# Patient Record
Sex: FEMALE | Race: BLACK OR AFRICAN AMERICAN | NOT HISPANIC OR LATINO | Employment: UNEMPLOYED | URBAN - METROPOLITAN AREA
[De-identification: names, ages, dates, MRNs, and addresses within clinical notes are randomized per-mention and may not be internally consistent; named-entity substitution may affect disease eponyms.]

---

## 2024-03-31 ENCOUNTER — APPOINTMENT (OUTPATIENT)
Dept: RADIOLOGY | Facility: HOSPITAL | Age: 64
End: 2024-03-31
Payer: MEDICARE

## 2024-03-31 ENCOUNTER — HOSPITAL ENCOUNTER (EMERGENCY)
Facility: HOSPITAL | Age: 64
Discharge: LEFT BEFORE DISCHARGE | End: 2024-04-01
Attending: STUDENT IN AN ORGANIZED HEALTH CARE EDUCATION/TRAINING PROGRAM
Payer: MEDICARE

## 2024-03-31 ENCOUNTER — APPOINTMENT (OUTPATIENT)
Dept: CARDIOLOGY | Facility: HOSPITAL | Age: 64
End: 2024-03-31
Payer: MEDICARE

## 2024-03-31 DIAGNOSIS — J45.909 UNCOMPLICATED ASTHMA, UNSPECIFIED ASTHMA SEVERITY, UNSPECIFIED WHETHER PERSISTENT (HHS-HCC): Primary | ICD-10-CM

## 2024-03-31 LAB
ALBUMIN SERPL BCP-MCNC: 4.1 G/DL (ref 3.4–5)
ALP SERPL-CCNC: 106 U/L (ref 33–136)
ALT SERPL W P-5'-P-CCNC: 10 U/L (ref 7–45)
ANION GAP BLDV CALCULATED.4IONS-SCNC: 6 MMOL/L (ref 10–25)
ANION GAP SERPL CALC-SCNC: 12 MMOL/L (ref 10–20)
AST SERPL W P-5'-P-CCNC: 9 U/L (ref 9–39)
BASE EXCESS BLDV CALC-SCNC: 2.5 MMOL/L (ref -2–3)
BASOPHILS # BLD AUTO: 0.04 X10*3/UL (ref 0–0.1)
BASOPHILS NFR BLD AUTO: 0.4 %
BILIRUB SERPL-MCNC: 0.5 MG/DL (ref 0–1.2)
BNP SERPL-MCNC: 12 PG/ML (ref 0–99)
BODY TEMPERATURE: ABNORMAL
BUN SERPL-MCNC: 12 MG/DL (ref 6–23)
CA-I BLDV-SCNC: 1.21 MMOL/L (ref 1.1–1.33)
CALCIUM SERPL-MCNC: 9.1 MG/DL (ref 8.6–10.3)
CARDIAC TROPONIN I PNL SERPL HS: 5 NG/L (ref 0–13)
CARDIAC TROPONIN I PNL SERPL HS: 6 NG/L (ref 0–13)
CHLORIDE BLDV-SCNC: 105 MMOL/L (ref 98–107)
CHLORIDE SERPL-SCNC: 103 MMOL/L (ref 98–107)
CO2 SERPL-SCNC: 27 MMOL/L (ref 21–32)
CREAT SERPL-MCNC: 0.74 MG/DL (ref 0.5–1.05)
EGFRCR SERPLBLD CKD-EPI 2021: 89 ML/MIN/1.73M*2
EOSINOPHIL # BLD AUTO: 0.92 X10*3/UL (ref 0–0.7)
EOSINOPHIL NFR BLD AUTO: 8.3 %
ERYTHROCYTE [DISTWIDTH] IN BLOOD BY AUTOMATED COUNT: 13.9 % (ref 11.5–14.5)
FLUAV RNA RESP QL NAA+PROBE: NOT DETECTED
FLUBV RNA RESP QL NAA+PROBE: NOT DETECTED
GLUCOSE BLDV-MCNC: 226 MG/DL (ref 74–99)
GLUCOSE SERPL-MCNC: 220 MG/DL (ref 74–99)
HCO3 BLDV-SCNC: 28.9 MMOL/L (ref 22–26)
HCT VFR BLD AUTO: 43.7 % (ref 36–46)
HCT VFR BLD EST: 45 % (ref 36–46)
HGB BLD-MCNC: 15.1 G/DL (ref 12–16)
HGB BLDV-MCNC: 15.1 G/DL (ref 12–16)
IMM GRANULOCYTES # BLD AUTO: 0.02 X10*3/UL (ref 0–0.7)
IMM GRANULOCYTES NFR BLD AUTO: 0.2 % (ref 0–0.9)
INHALED O2 CONCENTRATION: 21 %
INR PPP: 1.1 (ref 0.9–1.1)
LACTATE BLDV-SCNC: 1.4 MMOL/L (ref 0.4–2)
LYMPHOCYTES # BLD AUTO: 2.45 X10*3/UL (ref 1.2–4.8)
LYMPHOCYTES NFR BLD AUTO: 22.2 %
MAGNESIUM SERPL-MCNC: 1.99 MG/DL (ref 1.6–2.4)
MCH RBC QN AUTO: 31.8 PG (ref 26–34)
MCHC RBC AUTO-ENTMCNC: 34.6 G/DL (ref 32–36)
MCV RBC AUTO: 92 FL (ref 80–100)
MONOCYTES # BLD AUTO: 0.93 X10*3/UL (ref 0.1–1)
MONOCYTES NFR BLD AUTO: 8.4 %
NEUTROPHILS # BLD AUTO: 6.66 X10*3/UL (ref 1.2–7.7)
NEUTROPHILS NFR BLD AUTO: 60.5 %
NRBC BLD-RTO: 0 /100 WBCS (ref 0–0)
OXYHGB MFR BLDV: 70.3 % (ref 45–75)
PCO2 BLDV: 50 MM HG (ref 41–51)
PH BLDV: 7.37 PH (ref 7.33–7.43)
PLATELET # BLD AUTO: 332 X10*3/UL (ref 150–450)
PO2 BLDV: 40 MM HG (ref 35–45)
POTASSIUM BLDV-SCNC: 3.8 MMOL/L (ref 3.5–5.3)
POTASSIUM SERPL-SCNC: 3.9 MMOL/L (ref 3.5–5.3)
PROT SERPL-MCNC: 7.1 G/DL (ref 6.4–8.2)
PROTHROMBIN TIME: 12.5 SECONDS (ref 9.8–12.8)
RBC # BLD AUTO: 4.75 X10*6/UL (ref 4–5.2)
RSV RNA RESP QL NAA+PROBE: NOT DETECTED
SAO2 % BLDV: 72 % (ref 45–75)
SARS-COV-2 RNA RESP QL NAA+PROBE: NOT DETECTED
SODIUM BLDV-SCNC: 136 MMOL/L (ref 136–145)
SODIUM SERPL-SCNC: 138 MMOL/L (ref 136–145)
WBC # BLD AUTO: 11 X10*3/UL (ref 4.4–11.3)

## 2024-03-31 PROCEDURE — 2500000002 HC RX 250 W HCPCS SELF ADMINISTERED DRUGS (ALT 637 FOR MEDICARE OP, ALT 636 FOR OP/ED): Performed by: STUDENT IN AN ORGANIZED HEALTH CARE EDUCATION/TRAINING PROGRAM

## 2024-03-31 PROCEDURE — 84484 ASSAY OF TROPONIN QUANT: CPT | Performed by: STUDENT IN AN ORGANIZED HEALTH CARE EDUCATION/TRAINING PROGRAM

## 2024-03-31 PROCEDURE — 71045 X-RAY EXAM CHEST 1 VIEW: CPT | Mod: FOREIGN READ | Performed by: RADIOLOGY

## 2024-03-31 PROCEDURE — 83880 ASSAY OF NATRIURETIC PEPTIDE: CPT | Performed by: STUDENT IN AN ORGANIZED HEALTH CARE EDUCATION/TRAINING PROGRAM

## 2024-03-31 PROCEDURE — 94640 AIRWAY INHALATION TREATMENT: CPT

## 2024-03-31 PROCEDURE — 84132 ASSAY OF SERUM POTASSIUM: CPT

## 2024-03-31 PROCEDURE — 84132 ASSAY OF SERUM POTASSIUM: CPT | Performed by: STUDENT IN AN ORGANIZED HEALTH CARE EDUCATION/TRAINING PROGRAM

## 2024-03-31 PROCEDURE — 99285 EMERGENCY DEPT VISIT HI MDM: CPT | Mod: 25

## 2024-03-31 PROCEDURE — 83735 ASSAY OF MAGNESIUM: CPT

## 2024-03-31 PROCEDURE — 93005 ELECTROCARDIOGRAM TRACING: CPT

## 2024-03-31 PROCEDURE — 36415 COLL VENOUS BLD VENIPUNCTURE: CPT

## 2024-03-31 PROCEDURE — 2500000004 HC RX 250 GENERAL PHARMACY W/ HCPCS (ALT 636 FOR OP/ED)

## 2024-03-31 PROCEDURE — 87637 SARSCOV2&INF A&B&RSV AMP PRB: CPT | Performed by: STUDENT IN AN ORGANIZED HEALTH CARE EDUCATION/TRAINING PROGRAM

## 2024-03-31 PROCEDURE — 36415 COLL VENOUS BLD VENIPUNCTURE: CPT | Performed by: STUDENT IN AN ORGANIZED HEALTH CARE EDUCATION/TRAINING PROGRAM

## 2024-03-31 PROCEDURE — 85610 PROTHROMBIN TIME: CPT | Performed by: STUDENT IN AN ORGANIZED HEALTH CARE EDUCATION/TRAINING PROGRAM

## 2024-03-31 PROCEDURE — 96375 TX/PRO/DX INJ NEW DRUG ADDON: CPT

## 2024-03-31 PROCEDURE — 99285 EMERGENCY DEPT VISIT HI MDM: CPT | Performed by: STUDENT IN AN ORGANIZED HEALTH CARE EDUCATION/TRAINING PROGRAM

## 2024-03-31 PROCEDURE — 96365 THER/PROPH/DIAG IV INF INIT: CPT

## 2024-03-31 PROCEDURE — 85025 COMPLETE CBC W/AUTO DIFF WBC: CPT | Performed by: STUDENT IN AN ORGANIZED HEALTH CARE EDUCATION/TRAINING PROGRAM

## 2024-03-31 PROCEDURE — 2500000002 HC RX 250 W HCPCS SELF ADMINISTERED DRUGS (ALT 637 FOR MEDICARE OP, ALT 636 FOR OP/ED)

## 2024-03-31 PROCEDURE — 71045 X-RAY EXAM CHEST 1 VIEW: CPT

## 2024-03-31 RX ORDER — MAGNESIUM SULFATE HEPTAHYDRATE 40 MG/ML
2 INJECTION, SOLUTION INTRAVENOUS ONCE
Status: COMPLETED | OUTPATIENT
Start: 2024-03-31 | End: 2024-03-31

## 2024-03-31 RX ORDER — IPRATROPIUM BROMIDE AND ALBUTEROL SULFATE 2.5; .5 MG/3ML; MG/3ML
SOLUTION RESPIRATORY (INHALATION)
Status: DISCONTINUED
Start: 2024-03-31 | End: 2024-04-01 | Stop reason: HOSPADM

## 2024-03-31 RX ORDER — IPRATROPIUM BROMIDE AND ALBUTEROL SULFATE 2.5; .5 MG/3ML; MG/3ML
9 SOLUTION RESPIRATORY (INHALATION) ONCE
Status: COMPLETED | OUTPATIENT
Start: 2024-03-31 | End: 2024-03-31

## 2024-03-31 RX ADMIN — IPRATROPIUM BROMIDE AND ALBUTEROL SULFATE 9 ML: 2.5; .5 SOLUTION RESPIRATORY (INHALATION) at 21:40

## 2024-03-31 RX ADMIN — IPRATROPIUM BROMIDE AND ALBUTEROL SULFATE 9 ML: 2.5; .5 SOLUTION RESPIRATORY (INHALATION) at 22:29

## 2024-03-31 RX ADMIN — METHYLPREDNISOLONE SODIUM SUCCINATE 125 MG: 125 INJECTION, POWDER, FOR SOLUTION INTRAMUSCULAR; INTRAVENOUS at 21:42

## 2024-03-31 RX ADMIN — MAGNESIUM SULFATE HEPTAHYDRATE 2 G: 40 INJECTION, SOLUTION INTRAVENOUS at 21:42

## 2024-03-31 ASSESSMENT — LIFESTYLE VARIABLES
EVER HAD A DRINK FIRST THING IN THE MORNING TO STEADY YOUR NERVES TO GET RID OF A HANGOVER: NO
HAVE YOU EVER FELT YOU SHOULD CUT DOWN ON YOUR DRINKING: NO
HAVE PEOPLE ANNOYED YOU BY CRITICIZING YOUR DRINKING: NO
EVER FELT BAD OR GUILTY ABOUT YOUR DRINKING: NO
TOTAL SCORE: 0

## 2024-03-31 ASSESSMENT — PAIN SCALES - GENERAL: PAINLEVEL_OUTOF10: 0 - NO PAIN

## 2024-03-31 ASSESSMENT — COLUMBIA-SUICIDE SEVERITY RATING SCALE - C-SSRS
6. HAVE YOU EVER DONE ANYTHING, STARTED TO DO ANYTHING, OR PREPARED TO DO ANYTHING TO END YOUR LIFE?: NO
1. IN THE PAST MONTH, HAVE YOU WISHED YOU WERE DEAD OR WISHED YOU COULD GO TO SLEEP AND NOT WAKE UP?: NO
2. HAVE YOU ACTUALLY HAD ANY THOUGHTS OF KILLING YOURSELF?: NO
2. HAVE YOU ACTUALLY HAD ANY THOUGHTS OF KILLING YOURSELF?: NO
6. HAVE YOU EVER DONE ANYTHING, STARTED TO DO ANYTHING, OR PREPARED TO DO ANYTHING TO END YOUR LIFE?: NO

## 2024-03-31 ASSESSMENT — PAIN - FUNCTIONAL ASSESSMENT: PAIN_FUNCTIONAL_ASSESSMENT: 0-10

## 2024-04-01 VITALS
WEIGHT: 235 LBS | OXYGEN SATURATION: 100 % | TEMPERATURE: 97.7 F | BODY MASS INDEX: 41.64 KG/M2 | DIASTOLIC BLOOD PRESSURE: 64 MMHG | HEIGHT: 63 IN | RESPIRATION RATE: 18 BRPM | HEART RATE: 98 BPM | SYSTOLIC BLOOD PRESSURE: 143 MMHG

## 2024-04-01 PROCEDURE — 2500000002 HC RX 250 W HCPCS SELF ADMINISTERED DRUGS (ALT 637 FOR MEDICARE OP, ALT 636 FOR OP/ED)

## 2024-04-01 RX ORDER — PREDNISONE 10 MG/1
20 TABLET ORAL 2 TIMES DAILY
Qty: 20 TABLET | Refills: 0 | Status: SHIPPED | OUTPATIENT
Start: 2024-04-01 | End: 2024-04-06

## 2024-04-01 RX ORDER — IPRATROPIUM BROMIDE AND ALBUTEROL SULFATE 2.5; .5 MG/3ML; MG/3ML
3 SOLUTION RESPIRATORY (INHALATION)
Status: DISPENSED | OUTPATIENT
Start: 2024-04-01 | End: 2024-04-01

## 2024-04-01 RX ADMIN — IPRATROPIUM BROMIDE AND ALBUTEROL SULFATE 3 ML: 2.5; .5 SOLUTION RESPIRATORY (INHALATION) at 00:30

## 2024-04-01 NOTE — ED PROVIDER NOTES
EMERGENCY DEPARTMENT ENCOUNTER      Pt Name: Amber Lowery  MRN: [id number]  Birthdate 1960  Date of evaluation: 3/31/2024  Provider: Luke Hancock MD    CHIEF COMPLAINT       Chief Complaint   Patient presents with    Cough    Asthma     Seasonal allergies setting of asthma symptoms for 2-3 months         HISTORY OF PRESENT ILLNESS    64-year-old female presenting to the ED with complaint of shortness of breath. H/o asthma, hypertension, NIDDM 2.  Reports she has been taking her inhaler every hour for the past 3 days for coughing and shortness of breath with episodes of vomiting from coughing.  Denies fevers, chest pain, palpitations, leg swelling, or chills.      History provided by:  Patient      Nursing Notes were reviewed.    PAST MEDICAL HISTORY     Past Medical History:   Diagnosis Date    Other seasonal allergic rhinitis     Seasonal allergies    Personal history of other diseases of the musculoskeletal system and connective tissue     History of arthritis    Personal history of other diseases of the respiratory system     History of asthma         SURGICAL HISTORY       Past Surgical History:   Procedure Laterality Date    FOOT SURGERY  10/15/2015    Foot Surgery    OTHER SURGICAL HISTORY  06/21/2022    Hysteroscopic uterine polypectomy    OTHER SURGICAL HISTORY  06/21/2022    Loop electrosurgical excision procedure    OTHER SURGICAL HISTORY  03/21/2022    Knee surgery         CURRENT MEDICATIONS       Previous Medications    ACETAMINOPHEN (TYLENOL) 325 MG TABLET    TAKE 2 TABLETS BY MOUTH EVERY 4 HOURS AS NEEDED    ALBUTEROL 90 MCG/ACTUATION INHALER    INHALE 2 PUFFS BY MOUTH EVERY 4 HOURS AS NEEDED    AZITHROMYCIN (ZITHROMAX) 500 MG TABLET    TAKE 1 TABLET BY MOUTH ONCE DAILY    BENZONATATE (TESSALON) 100 MG CAPSULE    TAKE 1 CAPSULE BY MOUTH THREE TIMES A DAY AS NEEDED FOR COUGH    BLOOD PRESSURE TEST KIT-LARGE KIT    USE TO CHECK BLOOD PRESSURE AS DIRECTED    BLOOD SUGAR DIAGNOSTIC STRIP    USE TO  "TEST AS DIRECTED    BLOOD-GLUCOSE METER MISC    USE AS DIRECTED    CEFTRIAXONE (ROCEPHIN) 1 GRAM/50 ML IVPB    INJECT 1 BAG EVERY 24 HOURS VIA GRAVITY INFUSION AS DIRECTED. DRIP RATE = 33.33 DRIPS PER MINUTE    DOCUSATE SODIUM (COLACE) 100 MG CAPSULE    TAKE 1 CAPSULE BY MOUTH TWO TIMES A DAY AS NEEDED    DOXYCYCLINE (VIBRA-TABS) 100 MG TABLET    TAKE 1 TABLET BY MOUTH TWO TIMES A DAY    ENOXAPARIN (LOVENOX) 40 MG/0.4 ML SYRINGE    INJECT 0.4 ML INTO THE SKIN ONCE DAILY    FREESTYLE LANCETS 28 GAUGE    USE TO TEST SUGARS AS DIRECTED    GUAIFENESIN (MUCINEX) 600 MG 12 HR TABLET    TAKE 1 TABLET BY MOUTH TWO TIMES A DAY    INSULIN LISPRO (HUMALOG) 100 UNIT/ML INJECTION    USE WITH SLIDING SCALE    IPRATROPIUM-ALBUTEROL (DUO-NEB) 0.5-2.5 MG/3 ML NEBULIZER SOLUTION    USE 1 VIAL IN NEBULIZER FOUR TIMES A DAY AS NEEDED    LISINOPRIL 20 MG TABLET    TAKE 1 TABLET BY MOUTH ONCE DAILY    METFORMIN XR (GLUCOPHAGE-XR) 500 MG 24 HR TABLET    TAKE 2 TABLETS BY MOUTH TWO TIMES A DAY    METFORMIN XR (GLUCOPHAGE-XR) 500 MG 24 HR TABLET    TAKE 1 TABLET BY MOUTH TWO TIMES A DAY    METHYLPREDNISOLONE SOD SUC / (SOLU-MEDROL) 40 MG INJECTION    INJECT 4 ML (40 MG) INTRAVENOUSLY AS DIRECTED    PEN NEEDLE, DIABETIC 32 GAUGE X 5/32\" NEEDLE    USE TO INJECT INSULIN    PREDNISONE (DELTASONE) 20 MG TABLET    TAKE 2 TABLETS BY MOUTH ONCE DAILY       ALLERGIES     Patient has no known allergies.    FAMILY HISTORY     No family history on file.       SOCIAL HISTORY       Social History     Socioeconomic History    Marital status: Single     Spouse name: None    Number of children: None    Years of education: None    Highest education level: None   Occupational History    None   Tobacco Use    Smoking status: Never    Smokeless tobacco: Never   Vaping Use    Vaping Use: Never used   Substance and Sexual Activity    Alcohol use: Never    Drug use: Never    Sexual activity: None   Other Topics Concern    None   Social History Narrative    None "     Social Determinants of Health     Financial Resource Strain: Not on file   Food Insecurity: Not on file   Transportation Needs: Not on file   Physical Activity: Not on file   Stress: Not on file   Social Connections: Not on file   Intimate Partner Violence: Not on file   Housing Stability: Not on file       SCREENINGS                        PHYSICAL EXAM    (up to 7 for level 4, 8 or more for level 5)     ED Triage Vitals [03/31/24 2058]   Temperature Heart Rate Respirations BP   36.5 °C (97.7 °F) 99 (!) 22 140/70      Pulse Ox Temp Source Heart Rate Source Patient Position   98 % Temporal Monitor Sitting      BP Location FiO2 (%)     Right arm --       Physical Exam  Vitals and nursing note reviewed.   Constitutional:       General: She is awake. She is not in acute distress.     Appearance: Normal appearance. She is well-developed.   HENT:      Head: Normocephalic and atraumatic.      Right Ear: External ear normal.      Left Ear: External ear normal.      Nose: Nose normal. No congestion or rhinorrhea.      Mouth/Throat:      Mouth: Mucous membranes are moist.      Pharynx: Oropharynx is clear. No posterior oropharyngeal erythema.   Eyes:      General: No scleral icterus.        Right eye: No discharge.         Left eye: No discharge.      Extraocular Movements: Extraocular movements intact.      Conjunctiva/sclera: Conjunctivae normal.   Cardiovascular:      Rate and Rhythm: Normal rate and regular rhythm.      Pulses: Normal pulses.      Heart sounds: Normal heart sounds. No murmur heard.     No friction rub.   Pulmonary:      Breath sounds: Decreased air movement present. No stridor. Wheezing present. No rales.   Abdominal:      General: There is no distension.      Palpations: Abdomen is soft.      Tenderness: There is no abdominal tenderness. There is no right CVA tenderness, left CVA tenderness, guarding or rebound.   Musculoskeletal:         General: No swelling or tenderness.      Cervical back:  Normal range of motion and neck supple.      Right lower leg: No edema.      Left lower leg: No edema.   Skin:     General: Skin is warm and dry.      Capillary Refill: Capillary refill takes less than 2 seconds.      Coloration: Skin is not jaundiced or pale.      Findings: No lesion or rash.   Neurological:      General: No focal deficit present.      Mental Status: She is alert and oriented to person, place, and time. Mental status is at baseline.      Cranial Nerves: No cranial nerve deficit.      Sensory: No sensory deficit.      Motor: No weakness.   Psychiatric:         Mood and Affect: Mood normal.         Behavior: Behavior normal. Behavior is cooperative.         Thought Content: Thought content normal.         Judgment: Judgment normal.          DIAGNOSTIC RESULTS     LABS:  Labs Reviewed   CBC WITH AUTO DIFFERENTIAL - Abnormal       Result Value    WBC 11.0      nRBC 0.0      RBC 4.75      Hemoglobin 15.1      Hematocrit 43.7      MCV 92      MCH 31.8      MCHC 34.6      RDW 13.9      Platelets 332      Neutrophils % 60.5      Immature Granulocytes %, Automated 0.2      Lymphocytes % 22.2      Monocytes % 8.4      Eosinophils % 8.3      Basophils % 0.4      Neutrophils Absolute 6.66      Immature Granulocytes Absolute, Automated 0.02      Lymphocytes Absolute 2.45      Monocytes Absolute 0.93      Eosinophils Absolute 0.92 (*)     Basophils Absolute 0.04     COMPREHENSIVE METABOLIC PANEL - Abnormal    Glucose 220 (*)     Sodium 138      Potassium 3.9      Chloride 103      Bicarbonate 27      Anion Gap 12      Urea Nitrogen 12      Creatinine 0.74      eGFR 89      Calcium 9.1      Albumin 4.1      Alkaline Phosphatase 106      Total Protein 7.1      AST 9      Bilirubin, Total 0.5      ALT 10     BLOOD GAS VENOUS FULL PANEL - Abnormal    POCT pH, Venous 7.37      POCT pCO2, Venous 50      POCT pO2, Venous 40      POCT SO2, Venous 72      POCT Oxy Hemoglobin, Venous 70.3      POCT Hematocrit Calculated,  Venous 45.0      POCT Sodium, Venous 136      POCT Potassium, Venous 3.8      POCT Chloride, Venous 105      POCT Ionized Calicum, Venous 1.21      POCT Glucose, Venous 226 (*)     POCT Lactate, Venous 1.4      POCT Base Excess, Venous 2.5      POCT HCO3 Calculated, Venous 28.9 (*)     POCT Hemoglobin, Venous 15.1      POCT Anion Gap, Venous 6.0 (*)     Patient Temperature        FiO2 21     SARS-COV-2 AND INFLUENZA A/B PCR - Normal    Flu A Result Not Detected      Flu B Result Not Detected      Coronavirus 2019, PCR Not Detected      Narrative:     This assay has received FDA Emergency Use Authorization (EUA) and  is only authorized for the duration of time that circumstances exist to justify the authorization of the emergency use of in vitro diagnostic tests for the detection of SARS-CoV-2 virus and/or diagnosis of COVID-19 infection under section 564(b)(1) of the Act, 21 U.S.C. 360bbb-3(b)(1). Testing for SARS-CoV-2 is only recommended for patients who meet current clinical and/or epidemiological criteria as defined by federal, state, or local public health directives. This assay is an in vitro diagnostic nucleic acid amplification test for the qualitative detection of SARS-CoV-2, Influenza A, and Influenza B from nasopharyngeal specimens and has been validated for use at [facility]. Negative results do not preclude COVID-19 infections or Influenza A/B infections, and should not be used as the sole basis for diagnosis, treatment, or other management decisions. If Influenza A/B and RSV PCR results are negative, testing for Parainfluenza virus, Adenovirus and Metapneumovirus is routinely performed for [facility] pediatric oncology and intensive care inpatients, and is available on other patients by placing an add-on request.    RSV PCR - Normal    RSV PCR Not Detected      Narrative:     This assay is an FDA-cleared, in vitro diagnostic nucleic acid amplification test for the detection of RSV from  nasopharyngeal specimens, and has been validated for use at [facility]. Negative results do not preclude RSV infections, and should not be used as the sole basis for diagnosis, treatment, or other management decisions. If Influenza A/B and RSV PCR results are negative, testing for Parainfluenza virus, Adenovirus and Metapneumovirus is routinely performed for pediatric oncology and intensive care inpatients at [facility], and is available on other patients by placing an add-on request.       B-TYPE NATRIURETIC PEPTIDE - Normal    BNP 12      Narrative:        <100 pg/mL - Heart failure unlikely  100-299 pg/mL - Intermediate probability of acute heart                  failure exacerbation. Correlate with clinical                  context and patient history.    >=300 pg/mL - Heart Failure likely. Correlate with clinical                  context and patient history.    BNP testing is performed using different testing methodology at [facility] than at [facility]. Direct result comparisons should only be made within the same method.      PROTIME-INR - Normal    Protime 12.5      INR 1.1     SERIAL TROPONIN-INITIAL - Normal    Troponin I, High Sensitivity 6      Narrative:     Less than 99th percentile of normal range cutoff-  Female and children under 18 years old <14 ng/L; Male <21 ng/L: Negative  Repeat testing should be performed if clinically indicated.     Female and children under 18 years old 14-50 ng/L; Male 21-50 ng/L:  Consistent with possible cardiac damage and possible increased clinical   risk. Serial measurements may help to assess extent of myocardial damage.     >50 ng/L: Consistent with cardiac damage, increased clinical risk and  myocardial infarction. Serial measurements may help assess extent of   myocardial damage.      NOTE: Children less than 1 year old may have higher baseline troponin   levels and results should be interpreted in conjunction with the  overall   clinical context.     NOTE: Troponin I testing is performed using a different   testing methodology at [facility] than at other   [facility] hospitals. Direct result comparisons should only   be made within the same method.   MAGNESIUM - Normal    Magnesium 1.99     TROPONIN SERIES- (INITIAL, 1 HR)    Narrative:     The following orders were created for panel order Troponin I Series, High Sensitivity (0, 1 HR).  Procedure                               Abnormality         Status                     ---------                               -----------         ------                     Troponin I, High Sensiti...[[id number]]  Normal              Final result               Troponin, High Sensitivi...[[id number]]                      In process                   Please view results for these tests on the individual orders.   SERIAL TROPONIN, 1 HOUR       All other labs were within normal range or not returned as of this dictation.    Imaging  XR chest 1 view    (Results Pending)        Procedures  Procedures     EMERGENCY DEPARTMENT COURSE/MDM:     Diagnoses as of 04/01/24 0259   Uncomplicated asthma, unspecified asthma severity, unspecified whether persistent        Medical Decision Making  64-year-old female with history of asthma presenting with complaint of shortness of breath despite taking her inhaler every hour.  Patient's heart rate is greater than 90 with tachypnea and diffusely poor air movement with end expiratory wheezing.  DuoNebs, Solu-Medrol, and magnesium provided.  Basic labs, CXR, and viral studies ordered.    Patient does not have leukocytosis.  COVID RSV negative.  Patient had another coughing exacerbation in which a repeat DuoNeb treatment provided.  Troponin within normal limits.  While pending CXR read patient had another coughing exacerbation and continues to have poor lung sounds and wheezing.  Repeat breathing treatment provided.  CXR showed no pleural effusion loculation to  suggest bacterial pneumonia.    Patient counseled in length on staying in the hospital due to her frequent breathing treatments and need for continuous monitoring.  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  Reports she will leave AMA.    I was called to the bedside and informed that the patient would like to leave AGAINST MEDICAL ADVICE at this time.  The patient is oriented to person, place, time, and demonstrating all key elements of capacity to make decisions regarding the medical care offered.  The patient speaks coherently and exhibits no evidence of having an altered level of consciousness or alcohol or drug intoxication to a point that would impair ability to delineate a choice.  The patient demonstrates understanding and appreciation of the relevant information of the nature of their medical condition, as well as the risks, benefits, and treatment alternatives (including non-treatment), consequences of refusing care, and can appropriately communicate a rational reasoning about their choice of care options.  Patient is aware the suspected diagnosis suggested by history and exam. The patient demonstrates understanding and appreciation of the relevant information of the nature of their medical condition, as well as the risks, benefits, and treatment alternatives (including non-treatment), consequences of refusing care, and can appropriately communicate a rational reasoning about their choice of care options. The risks of refusing recommended care that were disclosed and acknowledged by the patient include death, neurologic dysfunction, permanent mental impairment, loss of limb, loss of current lifestyle, worsening of chronic condition, long-term disability. The patient understands they are welcome to return to the hospital at any time to receive the  recommended care or any other care at any time, regardless of their ability to pay for such care. Discharge instructions were provided to the patient.        Patient and or family in agreement and understanding of treatment plan.  All questions answered.      I reviewed the case with the attending ED physician. The attending ED physician agrees with the plan. Patient and/or patient´s representative was counseled regarding labs, imaging, likely diagnosis, and plan. All questions were answered.    ED Medications administered this visit:    Medications   methylPREDNISolone sod succinate (SOLU-Medrol) injection 125 mg (125 mg intravenous Given 3/31/24 2142)   magnesium sulfate IV 2 g (0 g intravenous Stopped 3/31/24 2242)   ipratropium-albuteroL (Duo-Neb) 0.5-2.5 mg/3 mL nebulizer solution 9 mL (9 mL nebulization Given 3/31/24 2140)   ipratropium-albuteroL (Duo-Neb) 0.5-2.5 mg/3 mL nebulizer solution 9 mL (9 mL nebulization Given 3/31/24 2229)       New Prescriptions from this visit:    New Prescriptions    No medications on file       Follow-up:  No follow-up provider specified.      Final Impression: No diagnosis found.      (Please note that portions of this note were completed with a voice recognition program.  Efforts were made to edit the dictations but occasionally words are mis-transcribed.)      Luke Hancock MD  Resident  04/01/24 2461

## 2024-04-06 LAB
ATRIAL RATE: 89 BPM
P AXIS: 80 DEGREES
P OFFSET: 179 MS
P ONSET: 135 MS
PR INTERVAL: 136 MS
Q ONSET: 203 MS
QRS COUNT: 15 BEATS
QRS DURATION: 94 MS
QT INTERVAL: 386 MS
QTC CALCULATION(BAZETT): 469 MS
QTC FREDERICIA: 440 MS
R AXIS: 50 DEGREES
T AXIS: 72 DEGREES
T OFFSET: 396 MS
VENTRICULAR RATE: 89 BPM

## 2024-05-19 ENCOUNTER — APPOINTMENT (OUTPATIENT)
Dept: RADIOLOGY | Facility: HOSPITAL | Age: 64
End: 2024-05-19
Payer: MEDICARE

## 2024-05-19 ENCOUNTER — APPOINTMENT (OUTPATIENT)
Dept: CARDIOLOGY | Facility: HOSPITAL | Age: 64
End: 2024-05-19
Payer: MEDICARE

## 2024-05-19 ENCOUNTER — HOSPITAL ENCOUNTER (EMERGENCY)
Facility: HOSPITAL | Age: 64
Discharge: HOME | End: 2024-05-20
Attending: EMERGENCY MEDICINE
Payer: MEDICARE

## 2024-05-19 DIAGNOSIS — R06.02 SHORTNESS OF BREATH: Primary | ICD-10-CM

## 2024-05-19 LAB
ALBUMIN SERPL BCP-MCNC: 4.3 G/DL (ref 3.4–5)
ALP SERPL-CCNC: 106 U/L (ref 33–136)
ALT SERPL W P-5'-P-CCNC: 9 U/L (ref 7–45)
ANION GAP SERPL CALC-SCNC: 13 MMOL/L (ref 10–20)
AST SERPL W P-5'-P-CCNC: 13 U/L (ref 9–39)
BASOPHILS # BLD AUTO: 0.05 X10*3/UL (ref 0–0.1)
BASOPHILS NFR BLD AUTO: 0.4 %
BILIRUB SERPL-MCNC: 0.4 MG/DL (ref 0–1.2)
BNP SERPL-MCNC: 8 PG/ML (ref 0–99)
BUN SERPL-MCNC: 16 MG/DL (ref 6–23)
CALCIUM SERPL-MCNC: 9.1 MG/DL (ref 8.6–10.3)
CARDIAC TROPONIN I PNL SERPL HS: 6 NG/L (ref 0–13)
CHLORIDE SERPL-SCNC: 100 MMOL/L (ref 98–107)
CO2 SERPL-SCNC: 25 MMOL/L (ref 21–32)
CREAT SERPL-MCNC: 0.75 MG/DL (ref 0.5–1.05)
EGFRCR SERPLBLD CKD-EPI 2021: 88 ML/MIN/1.73M*2
EOSINOPHIL # BLD AUTO: 0.99 X10*3/UL (ref 0–0.7)
EOSINOPHIL NFR BLD AUTO: 8.2 %
ERYTHROCYTE [DISTWIDTH] IN BLOOD BY AUTOMATED COUNT: 13.5 % (ref 11.5–14.5)
GLUCOSE SERPL-MCNC: 278 MG/DL (ref 74–99)
HCT VFR BLD AUTO: 44.8 % (ref 36–46)
HGB BLD-MCNC: 15.4 G/DL (ref 12–16)
IMM GRANULOCYTES # BLD AUTO: 0.02 X10*3/UL (ref 0–0.7)
IMM GRANULOCYTES NFR BLD AUTO: 0.2 % (ref 0–0.9)
LYMPHOCYTES # BLD AUTO: 2.76 X10*3/UL (ref 1.2–4.8)
LYMPHOCYTES NFR BLD AUTO: 22.8 %
MCH RBC QN AUTO: 31.5 PG (ref 26–34)
MCHC RBC AUTO-ENTMCNC: 34.4 G/DL (ref 32–36)
MCV RBC AUTO: 92 FL (ref 80–100)
MONOCYTES # BLD AUTO: 0.71 X10*3/UL (ref 0.1–1)
MONOCYTES NFR BLD AUTO: 5.9 %
NEUTROPHILS # BLD AUTO: 7.59 X10*3/UL (ref 1.2–7.7)
NEUTROPHILS NFR BLD AUTO: 62.5 %
NRBC BLD-RTO: 0 /100 WBCS (ref 0–0)
PLATELET # BLD AUTO: 302 X10*3/UL (ref 150–450)
POTASSIUM SERPL-SCNC: 4.2 MMOL/L (ref 3.5–5.3)
PROT SERPL-MCNC: 7.3 G/DL (ref 6.4–8.2)
RBC # BLD AUTO: 4.89 X10*6/UL (ref 4–5.2)
SODIUM SERPL-SCNC: 134 MMOL/L (ref 136–145)
WBC # BLD AUTO: 12.1 X10*3/UL (ref 4.4–11.3)

## 2024-05-19 PROCEDURE — 36415 COLL VENOUS BLD VENIPUNCTURE: CPT

## 2024-05-19 PROCEDURE — 96374 THER/PROPH/DIAG INJ IV PUSH: CPT

## 2024-05-19 PROCEDURE — 85025 COMPLETE CBC W/AUTO DIFF WBC: CPT

## 2024-05-19 PROCEDURE — 71046 X-RAY EXAM CHEST 2 VIEWS: CPT | Mod: COMPUTED RADIOGRAPHY X-RAY | Performed by: RADIOLOGY

## 2024-05-19 PROCEDURE — 71046 X-RAY EXAM CHEST 2 VIEWS: CPT | Mod: FY

## 2024-05-19 PROCEDURE — 36415 COLL VENOUS BLD VENIPUNCTURE: CPT | Performed by: EMERGENCY MEDICINE

## 2024-05-19 PROCEDURE — 84484 ASSAY OF TROPONIN QUANT: CPT

## 2024-05-19 PROCEDURE — 80053 COMPREHEN METABOLIC PANEL: CPT

## 2024-05-19 PROCEDURE — 83880 ASSAY OF NATRIURETIC PEPTIDE: CPT | Performed by: EMERGENCY MEDICINE

## 2024-05-19 PROCEDURE — 96361 HYDRATE IV INFUSION ADD-ON: CPT

## 2024-05-19 PROCEDURE — 94640 AIRWAY INHALATION TREATMENT: CPT

## 2024-05-19 PROCEDURE — 93005 ELECTROCARDIOGRAM TRACING: CPT

## 2024-05-19 PROCEDURE — 2500000002 HC RX 250 W HCPCS SELF ADMINISTERED DRUGS (ALT 637 FOR MEDICARE OP, ALT 636 FOR OP/ED)

## 2024-05-19 PROCEDURE — 2500000004 HC RX 250 GENERAL PHARMACY W/ HCPCS (ALT 636 FOR OP/ED)

## 2024-05-19 PROCEDURE — 99284 EMERGENCY DEPT VISIT MOD MDM: CPT | Mod: 25

## 2024-05-19 RX ORDER — IPRATROPIUM BROMIDE AND ALBUTEROL SULFATE 2.5; .5 MG/3ML; MG/3ML
9 SOLUTION RESPIRATORY (INHALATION) EVERY 4 HOURS PRN
Status: DISCONTINUED | OUTPATIENT
Start: 2024-05-19 | End: 2024-05-20 | Stop reason: HOSPADM

## 2024-05-19 RX ADMIN — SODIUM CHLORIDE, POTASSIUM CHLORIDE, SODIUM LACTATE AND CALCIUM CHLORIDE 1000 ML: 600; 310; 30; 20 INJECTION, SOLUTION INTRAVENOUS at 22:16

## 2024-05-19 RX ADMIN — METHYLPREDNISOLONE SODIUM SUCCINATE 125 MG: 125 INJECTION, POWDER, FOR SOLUTION INTRAMUSCULAR; INTRAVENOUS at 22:18

## 2024-05-19 RX ADMIN — IPRATROPIUM BROMIDE AND ALBUTEROL SULFATE 9 ML: 2.5; .5 SOLUTION RESPIRATORY (INHALATION) at 21:45

## 2024-05-19 ASSESSMENT — COLUMBIA-SUICIDE SEVERITY RATING SCALE - C-SSRS
1. IN THE PAST MONTH, HAVE YOU WISHED YOU WERE DEAD OR WISHED YOU COULD GO TO SLEEP AND NOT WAKE UP?: NO
2. HAVE YOU ACTUALLY HAD ANY THOUGHTS OF KILLING YOURSELF?: NO
6. HAVE YOU EVER DONE ANYTHING, STARTED TO DO ANYTHING, OR PREPARED TO DO ANYTHING TO END YOUR LIFE?: NO

## 2024-05-19 ASSESSMENT — PAIN SCALES - GENERAL: PAINLEVEL_OUTOF10: 0 - NO PAIN

## 2024-05-19 ASSESSMENT — PAIN - FUNCTIONAL ASSESSMENT: PAIN_FUNCTIONAL_ASSESSMENT: 0-10

## 2024-05-20 VITALS
OXYGEN SATURATION: 96 % | SYSTOLIC BLOOD PRESSURE: 156 MMHG | BODY MASS INDEX: 39.45 KG/M2 | TEMPERATURE: 98.1 F | WEIGHT: 222.66 LBS | DIASTOLIC BLOOD PRESSURE: 78 MMHG | HEART RATE: 89 BPM | HEIGHT: 63 IN | RESPIRATION RATE: 20 BRPM

## 2024-05-20 LAB — CARDIAC TROPONIN I PNL SERPL HS: 5 NG/L (ref 0–13)

## 2024-05-20 RX ORDER — PREDNISONE 50 MG/1
50 TABLET ORAL DAILY
Qty: 5 TABLET | Refills: 0 | Status: SHIPPED | OUTPATIENT
Start: 2024-05-20 | End: 2024-05-25

## 2024-05-20 ASSESSMENT — PAIN SCALES - GENERAL: PAINLEVEL_OUTOF10: 0 - NO PAIN

## 2024-05-20 NOTE — ED PROVIDER NOTES
HPI   Chief Complaint   Patient presents with    Shortness of Breath     Pt states she is having a hard time breathing.  She has used her at home nebulizer and her rescue inhaler multiple times.  Pt is wheezing at this time       Patient is a 64-year-old female with history of asthma presenting to [facility] ED for worsening shortness of breath.  Patient reports that the shortness of breath began earlier today.  Despite Trelegy inhaler use, constant albuterol nebulizer use, rescue inhaler use she has had present shortness of breath.  Patient does not use any oxygen at baseline.  Patient denies any recent viral illness.  Patient denies any active chest pain, upper respiratory symptoms, fever, chills, nausea, vomiting, headache, dizziness, or weakness.                          [place] Coma Scale Score: 15                     Patient History   Past Medical History:   Diagnosis Date    Other seasonal allergic rhinitis     Seasonal allergies    Personal history of other diseases of the musculoskeletal system and connective tissue     History of arthritis    Personal history of other diseases of the respiratory system     History of asthma     Past Surgical History:   Procedure Laterality Date    FOOT SURGERY  10/15/2015    Foot Surgery    OTHER SURGICAL HISTORY  06/21/2022    Hysteroscopic uterine polypectomy    OTHER SURGICAL HISTORY  06/21/2022    Loop electrosurgical excision procedure    OTHER SURGICAL HISTORY  03/21/2022    Knee surgery     No family history on file.  Social History     Tobacco Use    Smoking status: Never    Smokeless tobacco: Never   Vaping Use    Vaping status: Never Used   Substance Use Topics    Alcohol use: Never    Drug use: Never       Physical Exam   ED Triage Vitals [05/19/24 2104]   Temperature Heart Rate Respirations BP   36.7 °C (98.1 °F) (!) 118 20 (!) 186/86      Pulse Ox Temp Source Heart Rate Source Patient Position   96 % Temporal Monitor Sitting      BP Location FiO2 (%)      Right arm --       Physical Exam  Constitutional:       Appearance: Normal appearance. She is normal weight.   HENT:      Head: Normocephalic and atraumatic.      Nose: Nose normal.      Mouth/Throat:      Mouth: Mucous membranes are moist.      Pharynx: Oropharynx is clear.   Eyes:      Extraocular Movements: Extraocular movements intact.      Conjunctiva/sclera: Conjunctivae normal.      Pupils: Pupils are equal, round, and reactive to light.   Cardiovascular:      Rate and Rhythm: Normal rate and regular rhythm.      Pulses: Normal pulses.      Heart sounds: Normal heart sounds.   Pulmonary:      Effort: Pulmonary effort is normal.      Breath sounds: Examination of the right-upper field reveals wheezing. Examination of the left-upper field reveals wheezing. Examination of the right-middle field reveals wheezing. Examination of the left-middle field reveals wheezing. Examination of the right-lower field reveals wheezing. Examination of the left-lower field reveals wheezing. Wheezing present.   Abdominal:      General: Abdomen is flat. Bowel sounds are normal.      Palpations: Abdomen is soft.   Musculoskeletal:         General: Normal range of motion.      Cervical back: Normal range of motion and neck supple.   Skin:     General: Skin is warm and dry.      Capillary Refill: Capillary refill takes less than 2 seconds.   Neurological:      General: No focal deficit present.      Mental Status: She is alert and oriented to person, place, and time. Mental status is at baseline.   Psychiatric:         Mood and Affect: Mood normal.         Behavior: Behavior normal.         ED Course & MDM   Diagnoses as of 05/20/24 0037   Shortness of breath       Medical Decision Making  Patient is a 64 y.o. female who presents to [facility] ED for Shortness of Breath (Pt states she is having a hard time breathing.  She has used her at home nebulizer and her rescue inhaler multiple times.  Pt is wheezing at this time). On initial ED  evaluation, patient found to be in no acute distress. Per HPI, concern to evaluate and treat for acute bronchitis versus pneumonia versus other acute cardiopulmonary process.  Obtaining cardiac workup, chest x-ray.  Patient given IV fluids for initial tachycardia.  Administering 3X DuoNebs treatments, 125 mg methylprednisolone.  Lab work reviewed, grossly unremarkable.  Chest x-ray negative for any acute cardiopulmonary process.  Patient reassessed after therapies, had improvement in bilateral expiratory wheeze.  Patient was ambulated significantly after therapies, and did not come below saturation of 94% on room air.  Patient did not had any significant tachycardia with ambulation.  Patient denies any active chest pain at rest or with exertion.  Patient reports that she feels somewhat better with therapies.  Patient be discharged to home on steroid prescription.  Discussed diagnostic findings and treatment plan with patient.  Patient amenable to plan.    Rx given for prednisone. Patient to follow up with PCP outpatient. Anticipatory guidance and return precautions provided.  Patient otherwise stable for discharge.          Procedure  Procedures     Constantino Matson MD  Resident  05/20/24 0042

## 2024-05-20 NOTE — DISCHARGE INSTRUCTIONS
Please start taking steroid prescription given for complete course.  Please return to closest ED if you develop any worsening chest pain, shortness of breath, fever, chills, or weakness.

## 2024-05-27 LAB
ATRIAL RATE: 118 BPM
ATRIAL RATE: 88 BPM
P AXIS: 59 DEGREES
P AXIS: 60 DEGREES
P OFFSET: 198 MS
P OFFSET: 200 MS
P ONSET: 149 MS
P ONSET: 154 MS
PR INTERVAL: 138 MS
PR INTERVAL: 140 MS
Q ONSET: 219 MS
Q ONSET: 223 MS
QRS COUNT: 14 BEATS
QRS COUNT: 19 BEATS
QRS DURATION: 90 MS
QRS DURATION: 92 MS
QT INTERVAL: 336 MS
QT INTERVAL: 368 MS
QTC CALCULATION(BAZETT): 445 MS
QTC CALCULATION(BAZETT): 470 MS
QTC FREDERICIA: 418 MS
QTC FREDERICIA: 421 MS
R AXIS: -4 DEGREES
R AXIS: 13 DEGREES
T AXIS: 64 DEGREES
T AXIS: 66 DEGREES
T OFFSET: 387 MS
T OFFSET: 407 MS
VENTRICULAR RATE: 118 BPM
VENTRICULAR RATE: 88 BPM

## 2024-11-26 ENCOUNTER — HOSPITAL ENCOUNTER (EMERGENCY)
Facility: HOSPITAL | Age: 64
Discharge: HOME | End: 2024-11-26
Attending: EMERGENCY MEDICINE
Payer: MEDICARE

## 2024-11-26 ENCOUNTER — APPOINTMENT (OUTPATIENT)
Dept: RADIOLOGY | Facility: HOSPITAL | Age: 64
End: 2024-11-26
Payer: MEDICARE

## 2024-11-26 VITALS
HEIGHT: 63 IN | DIASTOLIC BLOOD PRESSURE: 81 MMHG | WEIGHT: 230 LBS | SYSTOLIC BLOOD PRESSURE: 169 MMHG | BODY MASS INDEX: 40.75 KG/M2 | HEART RATE: 79 BPM | OXYGEN SATURATION: 98 % | TEMPERATURE: 96.8 F | RESPIRATION RATE: 18 BRPM

## 2024-11-26 DIAGNOSIS — M25.562 ACUTE PAIN OF LEFT KNEE: Primary | ICD-10-CM

## 2024-11-26 PROCEDURE — 73564 X-RAY EXAM KNEE 4 OR MORE: CPT | Mod: LEFT SIDE | Performed by: SURGERY

## 2024-11-26 PROCEDURE — 2500000001 HC RX 250 WO HCPCS SELF ADMINISTERED DRUGS (ALT 637 FOR MEDICARE OP): Performed by: EMERGENCY MEDICINE

## 2024-11-26 PROCEDURE — 73590 X-RAY EXAM OF LOWER LEG: CPT | Mod: LEFT SIDE | Performed by: SURGERY

## 2024-11-26 PROCEDURE — 99284 EMERGENCY DEPT VISIT MOD MDM: CPT

## 2024-11-26 PROCEDURE — 73564 X-RAY EXAM KNEE 4 OR MORE: CPT | Mod: LT

## 2024-11-26 PROCEDURE — 73590 X-RAY EXAM OF LOWER LEG: CPT | Mod: LT

## 2024-11-26 RX ORDER — ACETAMINOPHEN 325 MG/1
975 TABLET ORAL ONCE
Status: COMPLETED | OUTPATIENT
Start: 2024-11-26 | End: 2024-11-26

## 2024-11-26 RX ORDER — IBUPROFEN 600 MG/1
600 TABLET ORAL ONCE
Status: DISCONTINUED | OUTPATIENT
Start: 2024-11-26 | End: 2024-11-26

## 2024-11-26 ASSESSMENT — COLUMBIA-SUICIDE SEVERITY RATING SCALE - C-SSRS
6. HAVE YOU EVER DONE ANYTHING, STARTED TO DO ANYTHING, OR PREPARED TO DO ANYTHING TO END YOUR LIFE?: NO
2. HAVE YOU ACTUALLY HAD ANY THOUGHTS OF KILLING YOURSELF?: NO
1. IN THE PAST MONTH, HAVE YOU WISHED YOU WERE DEAD OR WISHED YOU COULD GO TO SLEEP AND NOT WAKE UP?: NO

## 2024-11-26 ASSESSMENT — LIFESTYLE VARIABLES
EVER HAD A DRINK FIRST THING IN THE MORNING TO STEADY YOUR NERVES TO GET RID OF A HANGOVER: NO
EVER FELT BAD OR GUILTY ABOUT YOUR DRINKING: NO
TOTAL SCORE: 0
HAVE YOU EVER FELT YOU SHOULD CUT DOWN ON YOUR DRINKING: NO
HAVE PEOPLE ANNOYED YOU BY CRITICIZING YOUR DRINKING: NO

## 2024-11-26 ASSESSMENT — PAIN DESCRIPTION - ONSET: ONSET: SUDDEN

## 2024-11-26 ASSESSMENT — PAIN DESCRIPTION - PROGRESSION: CLINICAL_PROGRESSION: NOT CHANGED

## 2024-11-26 ASSESSMENT — PAIN DESCRIPTION - PAIN TYPE: TYPE: ACUTE PAIN

## 2024-11-26 ASSESSMENT — PAIN - FUNCTIONAL ASSESSMENT: PAIN_FUNCTIONAL_ASSESSMENT: 0-10

## 2024-11-26 ASSESSMENT — PAIN SCALES - GENERAL: PAINLEVEL_OUTOF10: 10 - WORST POSSIBLE PAIN

## 2024-11-26 ASSESSMENT — PAIN DESCRIPTION - DESCRIPTORS: DESCRIPTORS: ACHING;TIGHTNESS;PRESSURE

## 2024-11-26 ASSESSMENT — PAIN DESCRIPTION - ORIENTATION: ORIENTATION: LEFT

## 2024-11-26 ASSESSMENT — PAIN DESCRIPTION - LOCATION: LOCATION: LEG

## 2024-11-26 ASSESSMENT — PAIN DESCRIPTION - FREQUENCY: FREQUENCY: CONSTANT/CONTINUOUS

## 2024-11-27 NOTE — DISCHARGE INSTRUCTIONS
Rest, ice, and elevate your knee and also take Motrin and Tylenol as needed for pain.  Call to schedule follow-up appointment at the orthopedic walk-in clinic and either [facility] or [place].  See below for contact information.    [facility]  [address] [place] [address]    [facility]  [address] , [place] [address]    Phone number for both locations is [phone]

## 2024-11-27 NOTE — ED PROVIDER NOTES
EMERGENCY DEPARTMENT ENCOUNTER      Pt Name: Amber Lowery  MRN: [id number]  Birthdate 1960  Date of evaluation: 11/26/2024  Provider: Remington Zamora DO    CHIEF COMPLAINT       Chief Complaint   Patient presents with    Fall     Pt states slipped on water at a restaurant yesterday, fell forward onto left knee. C/o left leg pain radiating to calf. No thinners, no LOC, denies dizziness or lightheadedness before fall          HISTORY OF PRESENT ILLNESS    HPI    64-year-old female with past medical history of diabetes presenting to the emerged department for evaluation of right knee pain after a mechanical fall yesterday.  Patient states she slipped on some water and fell forward striking her left knee.  States she has been able to bear weight only with assistance and with moderate discomfort due to pain since despite taking Tylenol and Motrin.  Also reports swelling in the left knee.  States her pain is on the sides and posterior aspect of her knee with only slight discomfort to the anterior knee.  Denies prior fracture or surgery to the left extremity.  Not on blood thinners.  Patient states she did not hit her head and is not complaining of any neck or back pain.    Nursing Notes were reviewed.    PAST MEDICAL HISTORY     Past Medical History:   Diagnosis Date    Other seasonal allergic rhinitis     Seasonal allergies    Personal history of other diseases of the musculoskeletal system and connective tissue     History of arthritis    Personal history of other diseases of the respiratory system     History of asthma         SURGICAL HISTORY       Past Surgical History:   Procedure Laterality Date    FOOT SURGERY  10/15/2015    Foot Surgery    OTHER SURGICAL HISTORY  06/21/2022    Hysteroscopic uterine polypectomy    OTHER SURGICAL HISTORY  06/21/2022    Loop electrosurgical excision procedure    OTHER SURGICAL HISTORY  03/21/2022    Knee surgery         CURRENT MEDICATIONS       Discharge Medication List  as of 11/26/2024 11:31 PM        CONTINUE these medications which have NOT CHANGED    Details   albuterol 90 mcg/actuation inhaler INHALE 2 PUFFS BY MOUTH EVERY 4 HOURS AS NEEDED, Starting Mon 7/17/2023, Until Tue 7/16/2024 at 2359, Normal      insulin lispro (HumaLOG) 100 unit/mL injection USE WITH SLIDING SCALE, Starting Tue 7/18/2023, Until Wed 7/17/2024 at 2359, Normal      ipratropium-albuteroL (Duo-Neb) 0.5-2.5 mg/3 mL nebulizer solution USE 1 VIAL IN NEBULIZER FOUR TIMES A DAY AS NEEDED, Starting Mon 7/17/2023, Until Tue 7/16/2024 at 2359, Normal      lisinopril 20 mg tablet TAKE 1 TABLET BY MOUTH ONCE DAILY, Starting Thu 7/20/2023, Until Fri 7/19/2024, Normal      metFORMIN XR (Glucophage-XR) 500 mg 24 hr tablet TAKE 2 TABLETS BY MOUTH TWO TIMES A DAY, Starting Thu 7/20/2023, Until Fri 7/19/2024, Normal      metFORMIN XR (Glucophage-XR) 500 mg 24 hr tablet TAKE 1 TABLET BY MOUTH TWO TIMES A DAY, Starting Tue 7/18/2023, Until Wed 7/17/2024, Normal             ALLERGIES     Patient has no known allergies.    FAMILY HISTORY     No family history on file.       SOCIAL HISTORY       Social History     Socioeconomic History    Marital status: Single   Tobacco Use    Smoking status: Never    Smokeless tobacco: Never   Vaping Use    Vaping status: Never Used   Substance and Sexual Activity    Alcohol use: Never    Drug use: Never     Social Drivers of Health     Financial Resource Strain: Not on File (5/19/2024)    Received from [facility]    Financial Resource Strain     Financial Resource Strain: 0   Food Insecurity: Not on File (5/19/2024)    Received from [facility]    Food Insecurity     Food: 0   Transportation Needs: Not on File (5/19/2024)    Received from [facility]    Transportation Needs     Transportation: 0   Physical Activity: Not on File (5/19/2024)    Received from [facility]    Physical Activity     Physical Activity: 0   Stress: Not on File (5/19/2024)    Received from [facility]    Stress     Stress: 0   Social  Connections: Not on File (2024)    Received from [facility]     Social Connections and Isolation: 0   Housing Stability: Not on File (2024)    Received from [facility]    Housing Stability     Housin       SCREENINGS                        PHYSICAL EXAM    (up to 7 for level 4, 8 or more for level 5)     ED Triage Vitals [24 2137]   Temperature Heart Rate Respirations BP   36 °C (96.8 °F) 83 16 (!) 202/81      SpO2 Temp Source Heart Rate Source Patient Position   -- Temporal Monitor Sitting      BP Location FiO2 (%)     Right arm --       Physical Exam  Vitals and nursing note reviewed.   Constitutional:       General: She is not in acute distress.     Appearance: She is obese. She is not ill-appearing, toxic-appearing or diaphoretic.   HENT:      Head: Normocephalic and atraumatic.      Right Ear: External ear normal.      Left Ear: External ear normal.      Nose: Nose normal.      Mouth/Throat:      Pharynx: Oropharynx is clear.   Eyes:      Conjunctiva/sclera: Conjunctivae normal.   Cardiovascular:      Rate and Rhythm: Normal rate and regular rhythm.      Pulses: Normal pulses.      Heart sounds: Normal heart sounds.   Pulmonary:      Effort: Pulmonary effort is normal.      Breath sounds: Normal breath sounds.   Abdominal:      General: Abdomen is flat.      Palpations: Abdomen is soft.   Musculoskeletal:      Cervical back: Normal range of motion and neck supple.      Comments: Range of motion of the left knee limited due to pain.  Patient is exquisitely tender particularly over the proximal and lateral aspects of the tibia and fibula.  No obvious deformity.  Left knee is moderately edematous without overlying erythema or soft tissue injuries.  Otherwise no bony tenderness throughout the left extremity.  Range of motion testing limited due to pain.   Skin:     General: Skin is warm and dry.   Neurological:      General: No focal deficit present.      Mental Status: She is alert  and oriented to person, place, and time.   Psychiatric:         Mood and Affect: Mood normal.         Behavior: Behavior normal.          DIAGNOSTIC RESULTS     LABS:  Labs Reviewed - No data to display    All other labs were within normal range or not returned as of this dictation.    Imaging  XR knee left 4+ views   Final Result   No evidence of acute osseous abnormality in the imaged left lower   extremity.             MACRO:   None        Signed by: Levar Vargas 11/26/2024 11:13 PM   Dictation workstation:   [id number]      XR tibia fibula left 2 views   Final Result   No evidence of acute osseous abnormality in the imaged left lower   extremity.             MACRO:   None        Signed by: Levar Vargas 11/26/2024 11:13 PM   Dictation workstation:   [id number]           Procedures  Procedures     EMERGENCY DEPARTMENT COURSE/MDM:     ED Course as of 11/27/24 0430   Tue Nov 26, 2024 2239 This is a 64 years old female patient presented to the emergency department with a chief complaint of a fall and left-sided knee pain.  Stated that she fell yesterday after she slipped on a wet floor at a restaurant.  Complaining of left knee pain.  She braced her fall with her hands but is denying any rest, hand, elbow, shoulder pain.  Patient did not hit her head and denies losing consciousness.  Denies any neck pain, upper or lower back pain.  Denies any numbness, weakness, or tingling.  Denies chest pain, shortness of breath, abdominal pain, urinary symptoms, fever, chills or bodyaches.  Stated that she drove to the emergency department.    Review of system: As above in the HPI section.    Physical exam revealed a 64 years old female patient who does not appear to be in acute distress.  Cardiopulmonary as well as abdominal and neurologic exam are unremarkable.  Skin exam is unremarkable.  Musculoskeletal exam with no midline tenderness of the cervical, thoracic, lumbar, or sacral region.  No tenderness over the scaphoid bone.   Free range of motion to the hands, wrists, elbows, and shoulders.  Patient has tenderness to the left knee as well as the proximal tibia.  No tenderness to the left ankle or left hip.    Patient stated that she took Motrin 5 hours prior to arrival to the hospital.  Will administer Tylenol for pain control and will obtain x-ray to the left knee as well as the tibia/fibula.  Will provide the patient with knee immobilizer and will test her ability to ambulate and bear weight. [ME]   2336 Imaging were unremarkable patient was able to bear weight, we applied knee immobilizer and provide the patient with outpatient orthopedic surgery follow-up.  Patient is discharged in stable condition. [ME]      ED Course User Index  [ME] Gennaro Vee DO         Diagnoses as of 11/27/24 0430   Acute pain of left knee        Medical Decision Making    64-year-old female with past medical history of diabetes presenting to the emerged department for evaluation of right knee pain after a mechanical fall yesterday.  Hemodynamic stable, no acute distress, nontoxic-appearing, afebrile.  Tylenol ordered for pain as well as x-rays of the left knee and tibia and fibula which showed no evidence of acute fracture or dislocation.  Patient deemed safe for discharge for outpatient follow-up with orthopedic walk-in clinic.  Discharged with strict return precautions.    Patient and or family in agreement and understanding of treatment plan.  All questions answered.      I reviewed the case with the attending ED physician. The attending ED physician agrees with the plan. Patient and/or patient´s representative was counseled regarding labs, imaging, likely diagnosis, and plan. All questions were answered.    ED Medications administered this visit:    Medications   acetaminophen (Tylenol) tablet 975 mg (975 mg oral Given 11/26/24 2241)       New Prescriptions from this visit:    Discharge Medication List as of 11/26/2024 11:31 PM          Follow-up:   No follow-up provider specified.      Final Impression:   1. Acute pain of left knee          (Please note that portions of this note were completed with a voice recognition program.  Efforts were made to edit the dictations but occasionally words are mis-transcribed.)     Remington Zamora, DO  Resident  11/27/24 3195

## 2024-11-29 ENCOUNTER — OFFICE VISIT (OUTPATIENT)
Dept: ORTHOPEDIC SURGERY | Facility: CLINIC | Age: 64
End: 2024-11-29
Payer: MEDICARE

## 2024-11-29 ENCOUNTER — HOSPITAL ENCOUNTER (OUTPATIENT)
Dept: RADIOLOGY | Facility: HOSPITAL | Age: 64
Discharge: HOME | End: 2024-11-29
Payer: MEDICARE

## 2024-11-29 DIAGNOSIS — M79.89 LEG SWELLING: ICD-10-CM

## 2024-11-29 DIAGNOSIS — S86.112A GASTROCNEMIUS STRAIN, LEFT, INITIAL ENCOUNTER: ICD-10-CM

## 2024-11-29 DIAGNOSIS — M23.92 INTERNAL DERANGEMENT OF LEFT KNEE: ICD-10-CM

## 2024-11-29 PROCEDURE — 93971 EXTREMITY STUDY: CPT

## 2024-11-29 PROCEDURE — 99203 OFFICE O/P NEW LOW 30 MIN: CPT | Performed by: INTERNAL MEDICINE

## 2024-11-29 PROCEDURE — 99213 OFFICE O/P EST LOW 20 MIN: CPT | Mod: 25 | Performed by: INTERNAL MEDICINE

## 2024-11-29 PROCEDURE — 93971 EXTREMITY STUDY: CPT | Performed by: RADIOLOGY

## 2024-11-29 NOTE — PROGRESS NOTES
Acute Injury New Patient Visit    CC: No chief complaint on file.      HPI: Amber is a 64 y.o. female presents today for evaluation for acute left leg injury sustained after she slipped and fell two days ago. She states that she fell onto her left knee. She notes left lower leg left knee pain and increased swelling. She was evaluated in the ER, had x-rays taken of the tib-fib and left knee. She is here fro initial evaluation.          Review of Systems   GENERAL: Negative for malaise, significant weight loss, fever  MUSCULOSKELETAL: See HPI  NEURO:  Negative for numbness / tingling     Past Medical History  Past Medical History:   Diagnosis Date    Other seasonal allergic rhinitis     Seasonal allergies    Personal history of other diseases of the musculoskeletal system and connective tissue     History of arthritis    Personal history of other diseases of the respiratory system     History of asthma       Medication review  Medication Documentation Review Audit       Reviewed by Kylah Chaudhry RN (Registered Nurse) on 05/19/24 at 2107      Medication Order Taking? Sig Documenting Provider Last Dose Status   acetaminophen (Tylenol) 325 mg tablet [id number]  TAKE 2 TABLETS BY MOUTH EVERY 4 HOURS AS NEEDED Russell Rodríguez MD  Active   albuterol 90 mcg/actuation inhaler [id number]  INHALE 2 PUFFS BY MOUTH EVERY 4 HOURS AS NEEDED Charlene Collado MD  Active   azithromycin (Zithromax) 500 mg tablet [id number]  TAKE 1 TABLET BY MOUTH ONCE DAILY Russell Rodríguez MD  Active   benzonatate (Tessalon) 100 mg capsule [id number]  TAKE 1 CAPSULE BY MOUTH THREE TIMES A DAY AS NEEDED FOR COUGH Russell Rodríguez MD  Active   blood pressure test kit-large kit [id number]  USE TO CHECK BLOOD PRESSURE AS DIRECTED Keaton Jarrett MD  Active   blood sugar diagnostic strip [id number]  USE TO TEST AS DIRECTED Ramin Carter MD  Active   blood-glucose meter misc [id number]  USE AS DIRECTED Ramin Carter MD  Active   cefTRIAXone  "(Rocephin) 1 gram/50 mL IVPB [id number]  INJECT 1 BAG EVERY 24 HOURS VIA GRAVITY INFUSION AS DIRECTED. DRIP RATE = 33.33 DRIPS PER MINUTE Russell Rodríguez MD  Active   docusate sodium (Colace) 100 mg capsule [id number]  TAKE 1 CAPSULE BY MOUTH TWO TIMES A DAY AS NEEDED Charlene Collado MD  Active   doxycycline (Vibra-Tabs) 100 mg tablet [id number]  TAKE 1 TABLET BY MOUTH TWO TIMES A DAY Charlene Collado MD  Active   enoxaparin (Lovenox) 40 mg/0.4 mL syringe [id number]  INJECT 0.4 ML INTO THE SKIN ONCE DAILY Charlene Collado MD  Active   FreeStyle lancets 28 gauge [id number]  USE TO TEST SUGARS AS DIRECTED Ramin Carter MD  Active   guaiFENesin (Mucinex) 600 mg 12 hr tablet [id number]  TAKE 1 TABLET BY MOUTH TWO TIMES A DAY Russell Rodríguez MD  Active   insulin lispro (HumaLOG) 100 unit/mL injection [id number]  USE WITH SLIDING SCALE Ramin Carter MD  Active   ipratropium-albuteroL (Duo-Neb) 0.5-2.5 mg/3 mL nebulizer solution [id number]  USE 1 VIAL IN NEBULIZER FOUR TIMES A DAY AS NEEDED Russell Rodríguez MD  Active   lisinopril 20 mg tablet [id number]  TAKE 1 TABLET BY MOUTH ONCE DAILY Keaton Jarrett MD  Active   metFORMIN XR (Glucophage-XR) 500 mg 24 hr tablet [id number]  TAKE 2 TABLETS BY MOUTH TWO TIMES A DAY Keaton Jarrett MD  Active   metFORMIN XR (Glucophage-XR) 500 mg 24 hr tablet [id number]  TAKE 1 TABLET BY MOUTH TWO TIMES A DAY Ramin Carter MD  Active   methylPREDNISolone sod suc / (SOLU-Medrol) 40 mg injection [id number]  INJECT 4 ML (40 MG) INTRAVENOUSLY AS DIRECTED Russell Rodríguez MD  Active   pen needle, diabetic 32 gauge x 5/32\" needle [id number]  USE TO INJECT INSULIN Ramin Carter MD  Active                    Allergies  No Known Allergies    Social History  Social History     Socioeconomic History    Marital status: Single     Spouse name: Not on file    Number of children: Not on file    Years of education: Not on file    Highest education level: Not on file   Occupational History    Not on " file   Tobacco Use    Smoking status: Never    Smokeless tobacco: Never   Vaping Use    Vaping status: Never Used   Substance and Sexual Activity    Alcohol use: Never    Drug use: Never    Sexual activity: Not on file   Other Topics Concern    Not on file   Social History Narrative    Not on file     Social Drivers of Health     Financial Resource Strain: Not on File (2024)    Received from [facility]    Financial Resource Strain     Financial Resource Strain: 0   Food Insecurity: Not on File (2024)    Received from [facility]    Food Insecurity     Food: 0   Transportation Needs: Not on File (2024)    Received from [facility]    Transportation Needs     Transportation: 0   Physical Activity: Not on File (2024)    Received from [facility]    Physical Activity     Physical Activity: 0   Stress: Not on File (2024)    Received from [facility]    Stress     Stress: 0   Social Connections: Not on File (2024)    Received from [facility]    Social Connections     Social Connections and Isolation: 0   Intimate Partner Violence: Not on file   Housing Stability: Not on File (2024)    Received from [facility]    Housing Stability     Housin       Surgical History  Past Surgical History:   Procedure Laterality Date    FOOT SURGERY  10/15/2015    Foot Surgery    OTHER SURGICAL HISTORY  2022    Hysteroscopic uterine polypectomy    OTHER SURGICAL HISTORY  2022    Loop electrosurgical excision procedure    OTHER SURGICAL HISTORY  2022    Knee surgery       Physical Exam:  GENERAL:  Patient is awake, alert, and oriented to person place and time.  Patient appears well nourished and well kept.  Affect Calm, Not Acutely Distressed.  HEENT:  Normocephalic, Atraumatic, EOMI  CARDIOVASCULAR:  Hemodynamically stable.  RESPIRATORY:  Normal respirations with unlabored breathing.  Extremity: Left knee examination shows skin is intact.  There is no erythema or warmth.  Trace to 1+ effusion.  Can flex the right knee to  110 degrees with pain.  Full extension at 0 degrees.  Pain over the medial joint line.  Pain over the lateral joint line.  There is no pain over the patellar or quadricep tendon.  Pain over the proximal tibia.  No pain over the popliteal fossa.  Negative valgus stress test.  Negative varus stress test.  Positive Gabriela's test medially with  instability.  Positive Gabriela's test laterally with no instability.  Negative Lachman's test.  Patellar and quadricep mechanism intact.  Negative anterior and posterior drawer test.  Negative patellar apprehension test.  Distal pulses are palpable, neurovascularly intact.  Walking with no significant antalgic gait.    Left lower leg shows 1-2+ pitting edema.  Significant pain over the medial gastroc muscle belly, and at its origin.  Mild pain over the lateral gastroc muscle belly.  There is no pain over the myotendinous junction.  Achilles tendon is intact.  Negative Rubio's test.  Distal pulses are palpable.      Diagnostics: X-rays reviewed  XR knee left 4+ views, XR tibia fibula left 2 views  Narrative: Interpreted By:  Levar Vargas,   STUDY:  XR KNEE LEFT 4+ VIEWS; XR TIBIA FIBULA LEFT 2 VIEWS; ;  11/26/2024  11:02 pm      INDICATION:  Signs/Symptoms:Fall yesterday evening. Pain proximal tib fib and  posterior knee.; Signs/Symptoms:fall.          COMPARISON:  None.      ACCESSION NUMBER(S):  [id number]; [id number]      ORDERING CLINICIAN:  GURPREET HARRY      FINDINGS:  Four views of the left knee and three views of the left lower leg.      No acute fracture or malalignment is seen, noting that osteopenia  limits sensitivity for nondisplaced fracture.      Moderate degenerative changes of knee and midfoot articulations and  mild degenerative changes of the tibiotalar articulation. Plantar  calcaneal spur incidentally noted.      No definite joint effusion.      Diffuse subcutaneous edema without focal soft tissue swelling  observed. No subcutaneous air or foreign  body.      Impression: No evidence of acute osseous abnormality in the imaged left lower  extremity.          MACRO:  None      Signed by: Levar Alicia 11/26/2024 11:13 PM  Dictation workstation:   [id number]      Procedure: None    Assessment:   Left leg swelling  Left gastrocnemius muscle strain  Left knee internal derangement    Plan: Amber presents today for evaluation for acute left leg injury sustained after she slipped and fell two days ago. X-rays showed no obvious fractures. We recommended stat duplex ultrasound of the left lower leg to rule out DVT.  We will place her into a walking fracture boot with heel lift for her gastroc strain, and recommended a MRI of the left knee for preoperative planning, and to evaluate possible insufficiency fracture of the proximal tibia. She will follow-up after the MRI of the left knee.    No orders of the defined types were placed in this encounter.     At the conclusion of the visit there were no further questions by the patient/family regarding their plan of care.  Patient was instructed to call or return with any issues, questions, or concerns regarding their injury and/or treatment plan described above.     11/29/24 at 9:45 AM - Shannon Giles MD  Scribe Attestation  By signing my name below, I, Juan Judie Scrsloan   attest that this documentation has been prepared under the direction and in the presence of Shannon Giles MD.    Office: [phone]    This note was prepared using voice recognition software.  The details of this note are correct and have been reviewed, and corrected to the best of my ability.  Some grammatical errors may persist related to the Dragon software.

## 2024-12-04 ENCOUNTER — TELEPHONE (OUTPATIENT)
Dept: ORTHOPEDIC SURGERY | Facility: CLINIC | Age: 64
End: 2024-12-04
Payer: MEDICARE

## 2024-12-05 DIAGNOSIS — M23.92 INTERNAL DERANGEMENT OF LEFT KNEE: ICD-10-CM

## 2024-12-05 DIAGNOSIS — S86.112A GASTROCNEMIUS STRAIN, LEFT, INITIAL ENCOUNTER: ICD-10-CM

## 2024-12-05 RX ORDER — TRAMADOL HYDROCHLORIDE 50 MG/1
50 TABLET ORAL EVERY 12 HOURS PRN
Qty: 14 TABLET | Refills: 0 | Status: SHIPPED | OUTPATIENT
Start: 2024-12-05 | End: 2024-12-12

## 2024-12-16 ENCOUNTER — HOSPITAL ENCOUNTER (OUTPATIENT)
Dept: RADIOLOGY | Facility: CLINIC | Age: 64
Discharge: HOME | End: 2024-12-16
Payer: MEDICARE

## 2024-12-16 DIAGNOSIS — M23.92 INTERNAL DERANGEMENT OF LEFT KNEE: ICD-10-CM

## 2024-12-16 PROCEDURE — 73721 MRI JNT OF LWR EXTRE W/O DYE: CPT | Mod: LT

## 2024-12-16 PROCEDURE — 73721 MRI JNT OF LWR EXTRE W/O DYE: CPT | Mod: LEFT SIDE | Performed by: STUDENT IN AN ORGANIZED HEALTH CARE EDUCATION/TRAINING PROGRAM

## 2025-01-02 ENCOUNTER — OFFICE VISIT (OUTPATIENT)
Dept: ORTHOPEDIC SURGERY | Facility: CLINIC | Age: 65
End: 2025-01-02
Payer: MEDICARE

## 2025-01-02 DIAGNOSIS — S82.143A POSTERIOR TIBIAL PLATEAU FRACTURE, UNSPECIFIED LATERALITY, CLOSED, INITIAL ENCOUNTER: Primary | ICD-10-CM

## 2025-01-02 PROCEDURE — 27530 TREAT KNEE FRACTURE: CPT | Performed by: INTERNAL MEDICINE

## 2025-01-02 PROCEDURE — 1036F TOBACCO NON-USER: CPT | Performed by: INTERNAL MEDICINE

## 2025-01-02 PROCEDURE — 1159F MED LIST DOCD IN RCRD: CPT | Performed by: INTERNAL MEDICINE

## 2025-01-02 PROCEDURE — 99213 OFFICE O/P EST LOW 20 MIN: CPT | Mod: 57 | Performed by: INTERNAL MEDICINE

## 2025-01-02 RX ORDER — TRAMADOL HYDROCHLORIDE 50 MG/1
50 TABLET ORAL EVERY 8 HOURS PRN
Qty: 20 TABLET | Refills: 0 | Status: SHIPPED | OUTPATIENT
Start: 2025-01-02 | End: 2025-01-09

## 2025-01-02 RX ORDER — ACETAMINOPHEN AND CODEINE PHOSPHATE 300; 30 MG/1; MG/1
1 TABLET ORAL EVERY 12 HOURS PRN
Qty: 14 TABLET | Refills: 0 | Status: SHIPPED | OUTPATIENT
Start: 2025-01-02 | End: 2025-01-09

## 2025-01-02 NOTE — PROGRESS NOTES
CC:   No chief complaint on file.      HPI: Amber is a 64 y.o. female presents today for follow-up for MRI review.  Slight improvement, but still having pain when ambulating.  Pain more at the end of the day.        Review of Systems   GENERAL: Negative for malaise, significant weight loss, fever  MUSCULOSKELETAL: See HPI  NEURO:  Negative for numbness / tingling     Past Medical History  Past Medical History:   Diagnosis Date    Other seasonal allergic rhinitis     Seasonal allergies    Personal history of other diseases of the musculoskeletal system and connective tissue     History of arthritis    Personal history of other diseases of the respiratory system     History of asthma       Medication review  Medication Documentation Review Audit       Reviewed by Kylah Chaudhry RN (Registered Nurse) on 05/19/24 at 2107      Medication Order Taking? Sig Documenting Provider Last Dose Status   acetaminophen (Tylenol) 325 mg tablet [id number]  TAKE 2 TABLETS BY MOUTH EVERY 4 HOURS AS NEEDED Russell Rodríguez MD  Active   albuterol 90 mcg/actuation inhaler [id number]  INHALE 2 PUFFS BY MOUTH EVERY 4 HOURS AS NEEDED Charlene Collado MD  Active   azithromycin (Zithromax) 500 mg tablet [id number]  TAKE 1 TABLET BY MOUTH ONCE DAILY Russell Rodríguez MD  Active   benzonatate (Tessalon) 100 mg capsule [id number]  TAKE 1 CAPSULE BY MOUTH THREE TIMES A DAY AS NEEDED FOR COUGH Russell Rodríguez MD  Active   blood pressure test kit-large kit [id number]  USE TO CHECK BLOOD PRESSURE AS DIRECTED Keaton Jarrett MD  Active   blood sugar diagnostic strip [id number]  USE TO TEST AS DIRECTED Ramin Carter MD  Active   blood-glucose meter misc [id number]  USE AS DIRECTED Ramin Carter MD  Active   cefTRIAXone (Rocephin) 1 gram/50 mL IVPB [id number]  INJECT 1 BAG EVERY 24 HOURS VIA GRAVITY INFUSION AS DIRECTED. DRIP RATE = 33.33 DRIPS PER MINUTE Russell Rodríguez MD  Active   docusate sodium (Colace) 100 mg capsule [id number]  TAKE  "1 CAPSULE BY MOUTH TWO TIMES A DAY AS NEEDED Charlene Collado MD  Active   doxycycline (Vibra-Tabs) 100 mg tablet [id number]  TAKE 1 TABLET BY MOUTH TWO TIMES A DAY Charlene Collado MD  Active   enoxaparin (Lovenox) 40 mg/0.4 mL syringe [id number]  INJECT 0.4 ML INTO THE SKIN ONCE DAILY Charlene Collado MD  Active   FreeStyle lancets 28 gauge [id number]  USE TO TEST SUGARS AS DIRECTED Ramin Carter MD  Active   guaiFENesin (Mucinex) 600 mg 12 hr tablet [id number]  TAKE 1 TABLET BY MOUTH TWO TIMES A DAY Russell Rodríguez MD  Active   insulin lispro (HumaLOG) 100 unit/mL injection [id number]  USE WITH SLIDING SCALE Ramin Carter MD  Active   ipratropium-albuteroL (Duo-Neb) 0.5-2.5 mg/3 mL nebulizer solution [id number]  USE 1 VIAL IN NEBULIZER FOUR TIMES A DAY AS NEEDED Russell Rodríguez MD  Active   lisinopril 20 mg tablet [id number]  TAKE 1 TABLET BY MOUTH ONCE DAILY Keaton Jarrett MD  Active   metFORMIN XR (Glucophage-XR) 500 mg 24 hr tablet [id number]  TAKE 2 TABLETS BY MOUTH TWO TIMES A DAY Keaton Jarrett MD  Active   metFORMIN XR (Glucophage-XR) 500 mg 24 hr tablet [id number]  TAKE 1 TABLET BY MOUTH TWO TIMES A DAY Ramin Carter MD  Active   methylPREDNISolone sod suc / (SOLU-Medrol) 40 mg injection [id number]  INJECT 4 ML (40 MG) INTRAVENOUSLY AS DIRECTED Russell Rodríguez MD  Active   pen needle, diabetic 32 gauge x 5/32\" needle [id number]  USE TO INJECT INSULIN Ramin Carter MD  Active                    Allergies  No Known Allergies    Social History  Social History     Socioeconomic History    Marital status: Single     Spouse name: Not on file    Number of children: Not on file    Years of education: Not on file    Highest education level: Not on file   Occupational History    Not on file   Tobacco Use    Smoking status: Never    Smokeless tobacco: Never   Vaping Use    Vaping status: Never Used   Substance and Sexual Activity    Alcohol use: Never    Drug use: Never    Sexual activity: Not on file "   Other Topics Concern    Not on file   Social History Narrative    Not on file     Social Drivers of Health     Financial Resource Strain: Not on File (2024)    Received from [employer]    Financial Resource Strain     Financial Resource Strain: 0   Food Insecurity: Not on File (2024)    Received from [employer]    Food Insecurity     Food: 0   Transportation Needs: Not on File (2024)    Received from [employer]    Transportation Needs     Transportation: 0   Physical Activity: Not on File (2024)    Received from [employer]    Physical Activity     Physical Activity: 0   Stress: Not on File (2024)    Received from [employer]    Stress     Stress: 0   Social Connections: Not on File (2024)    Received from [employer]    Social Connections     Social Connections and Isolation: 0   Intimate Partner Violence: Not on file   Housing Stability: Not on File (2024)    Received from [employer]    Housing Stability     Housin       Surgical History  Past Surgical History:   Procedure Laterality Date    FOOT SURGERY  10/15/2015    Foot Surgery    OTHER SURGICAL HISTORY  2022    Hysteroscopic uterine polypectomy    OTHER SURGICAL HISTORY  2022    Loop electrosurgical excision procedure    OTHER SURGICAL HISTORY  2022    Knee surgery       Physical Exam:  GENERAL:  Patient is awake, alert, and oriented to person place and time.  Patient appears well nourished and well kept.  Affect Calm, Not Acutely Distressed.  HEENT:  Normocephalic, Atraumatic, EOMI  CARDIOVASCULAR:  Hemodynamically stable.  RESPIRATORY:  Normal respirations with unlabored breathing.  Extremity: Left knee examination shows skin is intact.  There is no erythema or warmth.  Trace to 1+ effusion.  Can flex the right knee to 110 degrees with pain.  Full extension at 0 degrees.  Pain over the medial joint line.  Pain over the lateral joint line.  There is no pain over the patellar or quadricep tendon.  Pain over the proximal tibia.  No pain  over the popliteal fossa.  Negative valgus stress test.  Negative varus stress test.  Negative Gabriela's test medially with no instability.  Negative Gabriela's test laterally with no instability.  Negative Lachman's test.  Patellar and quadricep mechanism intact.  Negative anterior and posterior drawer test.  Negative patellar apprehension test.  Distal pulses are palpable, neurovascularly intact.  Walking with no significant antalgic gait.       Diagnostics: MRI reviewed  MR knee left wo IV contrast  Narrative: Interpreted By:  Patrick Pollack and Lawrence Austen   STUDY:  MRI of the  left knee without IV contrast;  12/16/2024 9:58 am      INDICATION:  Signs/Symptoms:pain.      ,M23.92 Unspecified internal derangement of left knee      COMPARISON:  Radiographs 11/26/2024.      ACCESSION NUMBER(S):  [id number]      ORDERING CLINICIAN:  JAROD MAGALLANES      TECHNIQUE:  MR imaging of the  left knee was obtained  without IV contrast.      FINDINGS:  LIGAMENTS AND TENDONS:  The anterior cruciate ligament. The posterior cruciate ligament is  attached to the fracture fragment of bone from the posterior tibia.  In addition, there is mild irregularity involving the tibial  attachment which may reflect partial tearing. The medial collateral  ligament is intact but medially bowed. The lateral collateral  ligament, the biceps femoris tendon, the popliteus tendon and the  iliotibial band are intact. The quadriceps tendon and the patellar  tendon are intact.      MENISCI:  Complex tearing of the medial meniscus posterior horn and root with  radial components and horizontal component extending into the body.  The lateral meniscus is intact and without evidence of tear.      JOINTS:  Severe chondral thinning of the medial compartment and osteophytes.  Moderate chondral thinning of the lateral compartment with near  full-thickness chondral defect at the central lateral tibial plateau  and osteophytes. Button osteophyte of the  lateral femoral condyle in  the area of more pronounced articular cartilage thinning. Severe  diffuse chondral thinning of the patellofemoral compartment with  subchondral edema and osteophytes, most pronounced laterally. Small  joint effusion. Small popliteal cyst. Small volume pes anserinus  bursal fluid.      OSSEOUS STRUCTURES:  Minimally displaced osseous fragment at the PCL attachment on the  tibia with underlying fluid cleft. Nondisplaced macrotrabecular  fracture of the anterior central lateral tibial plateau with  extensive marrow edema.      SOFT TISSUES:  Nonspecific soft tissue edema of the anterior knee.  The common peroneal nerve is intact.      Impression: 1. Fracture of the tibia at the distal attachment of the posterior  cruciate ligament which is also partially torn.  2. Nondisplaced fracture of the anterior lateral tibial plateau.  3. Complex tearing of the medial meniscus posterior horn, root,  extending to the body.  4. Severe patellofemoral and medial compartment, along with moderate  lateral compartment degenerative changes.          I personally reviewed the images/study and I agree with the findings  as stated. This study was interpreted at [place], [place].      MACRO:  None      Signed by: Patrick Pollack 12/16/2024 10:30 AM  Dictation workstation:   [id number]        Procedure: None    Assessment:  1.  Acute left knee lateral tibial plateau fracture  2.  Severe left knee osteoarthritis with superimposed medial meniscal tear      Plan: mAber presents today for follow-up for MRI review. We discussed the MRI review in detail today. We recommended nonsurgical treatment with non weight bearing with the use of a walker or crutches.  We sent a prescription of Tylenol with codeine for breakthrough pain.  Surgical options would entail a total knee replacement which would like to avoid at this time.  She will follow-up in 2-3 weeks for reevaluation, we  will repeat x-rays of the left knee, 2 views, AP and lateral views.      No orders of the defined types were placed in this encounter.     At the conclusion of the visit there were no further questions by the patient/family regarding their plan of care.  Patient was instructed to call or return with any issues, questions, or concerns regarding their injury and/or treatment plan described above.     01/02/25 at 8:39 AM - Shannon Giles MD  Scribe Attestation  By signing my name below, I, Juan Judie, Scribe   attest that this documentation has been prepared under the direction and in the presence of Shannon Giles MD.    Office: [phone]    This note was prepared using voice recognition software.  The details of this note are correct and have been reviewed, and corrected to the best of my ability.  Some grammatical errors may persist related to the Dragon software.

## 2025-01-23 ENCOUNTER — APPOINTMENT (OUTPATIENT)
Dept: ORTHOPEDIC SURGERY | Facility: CLINIC | Age: 65
End: 2025-01-23
Payer: MEDICARE

## 2025-01-30 ENCOUNTER — HOSPITAL ENCOUNTER (OUTPATIENT)
Dept: RADIOLOGY | Facility: CLINIC | Age: 65
Discharge: HOME | End: 2025-01-30
Payer: MEDICARE

## 2025-01-30 ENCOUNTER — OFFICE VISIT (OUTPATIENT)
Dept: ORTHOPEDIC SURGERY | Facility: CLINIC | Age: 65
End: 2025-01-30
Payer: MEDICARE

## 2025-01-30 DIAGNOSIS — S82.143A POSTERIOR TIBIAL PLATEAU FRACTURE, UNSPECIFIED LATERALITY, CLOSED, INITIAL ENCOUNTER: ICD-10-CM

## 2025-01-30 PROCEDURE — 99211 OFF/OP EST MAY X REQ PHY/QHP: CPT | Performed by: INTERNAL MEDICINE

## 2025-01-30 PROCEDURE — 73560 X-RAY EXAM OF KNEE 1 OR 2: CPT | Mod: LT

## 2025-01-30 RX ORDER — ACETAMINOPHEN AND CODEINE PHOSPHATE 300; 30 MG/1; MG/1
1 TABLET ORAL EVERY 12 HOURS PRN
Qty: 14 TABLET | Refills: 0 | Status: SHIPPED | OUTPATIENT
Start: 2025-01-30 | End: 2025-02-06

## 2025-01-30 NOTE — PROGRESS NOTES
CC:   No chief complaint on file.      HPI: Amber is a 65 y.o. female presents today for reevaluation for left knee lateral tibial plateau fracture and severe left knee osteoarthritis with superimposed medial meniscal tear. She notes left knee pain and swelling. Repeat x-rays today. She states that she is using a walker for ambulating.         Review of Systems   GENERAL: Negative for malaise, significant weight loss, fever  MUSCULOSKELETAL: See HPI  NEURO:  Negative for numbness / tingling     Past Medical History  Past Medical History:   Diagnosis Date    Other seasonal allergic rhinitis     Seasonal allergies    Personal history of other diseases of the musculoskeletal system and connective tissue     History of arthritis    Personal history of other diseases of the respiratory system     History of asthma       Medication review  Medication Documentation Review Audit       Reviewed by Eve Koo MA (Medical Assistant) on 25 at 0844      Medication Order Taking? Sig Documenting Provider Last Dose Status   albuterol 90 mcg/actuation inhaler [id number]  INHALE 2 PUFFS BY MOUTH EVERY 4 HOURS AS NEEDED Charlene Collado MD   24 235   insulin lispro (HumaLOG) 100 unit/mL injection [id number]  USE WITH SLIDING SCALE Ramin Carter MD   24 235   ipratropium-albuteroL (Duo-Neb) 0.5-2.5 mg/3 mL nebulizer solution [id number]  USE 1 VIAL IN NEBULIZER FOUR TIMES A DAY AS NEEDED Russell Rodríguez MD   24 235   lisinopril 20 mg tablet [id number]  TAKE 1 TABLET BY MOUTH ONCE DAILY Keaton Jarrett MD   24 235   metFORMIN XR (Glucophage-XR) 500 mg 24 hr tablet [id number]  TAKE 2 TABLETS BY MOUTH TWO TIMES A DAY Keaton Jarrett MD   24 235   metFORMIN XR (Glucophage-XR) 500 mg 24 hr tablet [id number]  TAKE 1 TABLET BY MOUTH TWO TIMES A DAY Ramin Carter MD   24 235                    Allergies  No Known Allergies    Social  History  Social History     Socioeconomic History    Marital status: Single     Spouse name: Not on file    Number of children: Not on file    Years of education: Not on file    Highest education level: Not on file   Occupational History    Not on file   Tobacco Use    Smoking status: Never    Smokeless tobacco: Never   Vaping Use    Vaping status: Never Used   Substance and Sexual Activity    Alcohol use: Never    Drug use: Never    Sexual activity: Not on file   Other Topics Concern    Not on file   Social History Narrative    Not on file     Social Drivers of Health     Financial Resource Strain: Not on File (2024)    Received from [employer]    Financial Resource Strain     Financial Resource Strain: 0   Food Insecurity: Not on File (2024)    Received from [employer]    Food Insecurity     Food: 0   Transportation Needs: Not on File (2024)    Received from [employer]    Transportation Needs     Transportation: 0   Physical Activity: Not on File (2024)    Received from [employer]    Physical Activity     Physical Activity: 0   Stress: Not on File (2024)    Received from [employer]    Stress     Stress: 0   Social Connections: Not on File (2024)    Received from [employer]    Social Connections     Social Connections and Isolation: 0   Intimate Partner Violence: Not on file   Housing Stability: Not on File (2024)    Received from [employer]    Housing Stability     Housin       Surgical History  Past Surgical History:   Procedure Laterality Date    FOOT SURGERY  10/15/2015    Foot Surgery    OTHER SURGICAL HISTORY  2022    Hysteroscopic uterine polypectomy    OTHER SURGICAL HISTORY  2022    Loop electrosurgical excision procedure    OTHER SURGICAL HISTORY  2022    Knee surgery       Physical Exam:  GENERAL:  Patient is awake, alert, and oriented to person place and time.  Patient appears well nourished and well kept.  Affect Calm, Not Acutely Distressed.  HEENT:  Normocephalic, Atraumatic,  EOMI  CARDIOVASCULAR:  Hemodynamically stable.  RESPIRATORY:  Normal respirations with unlabored breathing.  Extremity: Left knee examination shows skin is intact.  There is no erythema or warmth.  Trace amount of effusion.  Can flex the right knee to 120 degrees with minimal pain.  Full extension at 0 degrees.  Mild pain over the medial joint line.  Mild pain over the lateral joint line.  There is no pain over the patellar or quadricep tendon.  Mild pain over the proximal tibia.  No pain over the popliteal fossa.  Negative valgus stress test.  Negative varus stress test.  Negative Gabriela's test medially with no instability.  Negative Gabriela's test laterally with no instability.  Negative Lachman's test.  Patellar and quadricep mechanism intact.  Negative anterior and posterior drawer test.  Negative patellar apprehension test.  Distal pulses are palpable, neurovascularly intact.  Walking with no significant antalgic gait.       Diagnostics: X-rays reviewed  MR knee left wo IV contrast  Narrative: Interpreted By:  Patrick Pollack and Lawrence Austen   STUDY:  MRI of the  left knee without IV contrast;  12/16/2024 9:58 am      INDICATION:  Signs/Symptoms:pain.      ,M23.92 Unspecified internal derangement of left knee      COMPARISON:  Radiographs 11/26/2024.      ACCESSION NUMBER(S):  [id number]      ORDERING CLINICIAN:  JAROD MAGALLANES      TECHNIQUE:  MR imaging of the  left knee was obtained  without IV contrast.      FINDINGS:  LIGAMENTS AND TENDONS:  The anterior cruciate ligament. The posterior cruciate ligament is  attached to the fracture fragment of bone from the posterior tibia.  In addition, there is mild irregularity involving the tibial  attachment which may reflect partial tearing. The medial collateral  ligament is intact but medially bowed. The lateral collateral  ligament, the biceps femoris tendon, the popliteus tendon and the  iliotibial band are intact. The quadriceps tendon and the  patellar  tendon are intact.      MENISCI:  Complex tearing of the medial meniscus posterior horn and root with  radial components and horizontal component extending into the body.  The lateral meniscus is intact and without evidence of tear.      JOINTS:  Severe chondral thinning of the medial compartment and osteophytes.  Moderate chondral thinning of the lateral compartment with near  full-thickness chondral defect at the central lateral tibial plateau  and osteophytes. Button osteophyte of the lateral femoral condyle in  the area of more pronounced articular cartilage thinning. Severe  diffuse chondral thinning of the patellofemoral compartment with  subchondral edema and osteophytes, most pronounced laterally. Small  joint effusion. Small popliteal cyst. Small volume pes anserinus  bursal fluid.      OSSEOUS STRUCTURES:  Minimally displaced osseous fragment at the PCL attachment on the  tibia with underlying fluid cleft. Nondisplaced macrotrabecular  fracture of the anterior central lateral tibial plateau with  extensive marrow edema.      SOFT TISSUES:  Nonspecific soft tissue edema of the anterior knee.  The common peroneal nerve is intact.      Impression: 1. Fracture of the tibia at the distal attachment of the posterior  cruciate ligament which is also partially torn.  2. Nondisplaced fracture of the anterior lateral tibial plateau.  3. Complex tearing of the medial meniscus posterior horn, root,  extending to the body.  4. Severe patellofemoral and medial compartment, along with moderate  lateral compartment degenerative changes.          I personally reviewed the images/study and I agree with the findings  as stated. This study was interpreted at [facility], [place], [place].      MACRO:  None      Signed by: Patrick Pollack 12/16/2024 10:30 AM  Dictation workstation:   [id number]        Procedure: None    Assessment:   1.  Acute left knee lateral tibial plateau  fracture  2.  Severe left knee osteoarthritis with superimposed medial meniscal tear    Plan: Amber presents today for reevaluation for left knee lateral tibial plateau fracture and severe left knee osteoarthritis with superimposed medial meniscal tear. She is still symptomatic, recommended to continue with nonweightbearing status with a walker. We filled Tylenol with codeine for breakthrough pain. She will follow-up in 6-7 weeks for reevaluation, and repeat x-rays of the left knee 2 views AP and lateral view.     No orders of the defined types were placed in this encounter.     At the conclusion of the visit there were no further questions by the patient/family regarding their plan of care.  Patient was instructed to call or return with any issues, questions, or concerns regarding their injury and/or treatment plan described above.     01/30/25 at 3:21 PM - Shannon Giles MD  Scribe Attestation  By signing my name below, I, Juan Gerbermo, Scribe   attest that this documentation has been prepared under the direction and in the presence of Shannon Giles MD.    Office: [phone]    This note was prepared using voice recognition software.  The details of this note are correct and have been reviewed, and corrected to the best of my ability.  Some grammatical errors may persist related to the Dragon software.

## 2025-03-13 ENCOUNTER — HOSPITAL ENCOUNTER (OUTPATIENT)
Dept: RADIOLOGY | Facility: CLINIC | Age: 65
Discharge: HOME | End: 2025-03-13
Payer: MEDICARE

## 2025-03-13 ENCOUNTER — OFFICE VISIT (OUTPATIENT)
Dept: ORTHOPEDIC SURGERY | Facility: CLINIC | Age: 65
End: 2025-03-13
Payer: MEDICARE

## 2025-03-13 DIAGNOSIS — M25.562 LEFT KNEE PAIN, UNSPECIFIED CHRONICITY: ICD-10-CM

## 2025-03-13 DIAGNOSIS — M23.92 INTERNAL DERANGEMENT OF LEFT KNEE: ICD-10-CM

## 2025-03-13 DIAGNOSIS — M79.671 RIGHT FOOT PAIN: ICD-10-CM

## 2025-03-13 DIAGNOSIS — S82.143A POSTERIOR TIBIAL PLATEAU FRACTURE, UNSPECIFIED LATERALITY, CLOSED, INITIAL ENCOUNTER: ICD-10-CM

## 2025-03-13 DIAGNOSIS — M25.562 LEFT KNEE PAIN, UNSPECIFIED CHRONICITY: Primary | ICD-10-CM

## 2025-03-13 DIAGNOSIS — M19.071 ARTHRITIS OF FOOT, RIGHT: ICD-10-CM

## 2025-03-13 PROCEDURE — 73560 X-RAY EXAM OF KNEE 1 OR 2: CPT | Mod: LT

## 2025-03-13 PROCEDURE — 73630 X-RAY EXAM OF FOOT: CPT | Mod: RT

## 2025-03-13 PROCEDURE — 99211 OFF/OP EST MAY X REQ PHY/QHP: CPT | Performed by: INTERNAL MEDICINE

## 2025-03-13 PROCEDURE — 99214 OFFICE O/P EST MOD 30 MIN: CPT | Performed by: INTERNAL MEDICINE

## 2025-03-13 RX ORDER — METHYLPREDNISOLONE 4 MG/1
TABLET ORAL
Qty: 21 TABLET | Refills: 0 | Status: SHIPPED | OUTPATIENT
Start: 2025-03-13

## 2025-03-13 NOTE — PROGRESS NOTES
CC:   Chief Complaint   Patient presents with    Left Knee - Follow-up, Fracture     Lateral tibial plateau fx.   Xray today       HPI: Amber is a 65 y.o. female presents today for reevaluation for left knee lateral tibial plateau fracture and severe left knee osteoarthritis with superimposed medial meniscal tear. She states that she is doing much better.  She also complains of increased pain of the right foot, with a history of previous surgery to the right foot over 40 years ago.  She describes a burning sensation of the top of the right foot.      Review of Systems   GENERAL: Negative for malaise, significant weight loss, fever  MUSCULOSKELETAL: See HPI  NEURO:  Negative for numbness / tingling     Past Medical History  Past Medical History:   Diagnosis Date    Other seasonal allergic rhinitis     Seasonal allergies    Personal history of other diseases of the musculoskeletal system and connective tissue     History of arthritis    Personal history of other diseases of the respiratory system     History of asthma       Medication review  Medication Documentation Review Audit       Reviewed by Eve Koo MA (Medical Assistant) on 25 at 0911      Medication Order Taking? Sig Documenting Provider Last Dose Status   albuterol 90 mcg/actuation inhaler [id number]  INHALE 2 PUFFS BY MOUTH EVERY 4 HOURS AS NEEDED Charlene Collado MD   24 2359   insulin lispro (HumaLOG) 100 unit/mL injection [id number]  USE WITH SLIDING SCALE Ramin Carter MD   24 235   ipratropium-albuteroL (Duo-Neb) 0.5-2.5 mg/3 mL nebulizer solution [id number]  USE 1 VIAL IN NEBULIZER FOUR TIMES A DAY AS NEEDED Russell Rodríguez MD   24 235   lisinopril 20 mg tablet [id number]  TAKE 1 TABLET BY MOUTH ONCE DAILY Keaton Jarrett MD   24 235   metFORMIN XR (Glucophage-XR) 500 mg 24 hr tablet [id number]  TAKE 2 TABLETS BY MOUTH TWO TIMES A DAY Keaton Jarrett MD   24 235    metFORMIN XR (Glucophage-XR) 500 mg 24 hr tablet [id number]  TAKE 1 TABLET BY MOUTH TWO TIMES A DAY Ramin Carter MD   24 2117                    Allergies  No Known Allergies    Social History  Social History     Socioeconomic History    Marital status: Single     Spouse name: Not on file    Number of children: Not on file    Years of education: Not on file    Highest education level: Not on file   Occupational History    Not on file   Tobacco Use    Smoking status: Never    Smokeless tobacco: Never   Vaping Use    Vaping status: Never Used   Substance and Sexual Activity    Alcohol use: Never    Drug use: Never    Sexual activity: Not on file   Other Topics Concern    Not on file   Social History Narrative    Not on file     Social Drivers of Health     Financial Resource Strain: Not on File (2024)    Received from [facility]    Financial Resource Strain     Financial Resource Strain: 0   Food Insecurity: Not on File (2024)    Received from [facility]    Food Insecurity     Food: 0   Transportation Needs: Not on File (2024)    Received from [facility]    Transportation Needs     Transportation: 0   Physical Activity: Not on File (2024)    Received from [facility]    Physical Activity     Physical Activity: 0   Stress: Not on File (2024)    Received from [facility]    Stress     Stress: 0   Social Connections: Not on File (2024)    Received from [facility]    Social Connections     Social Connections and Isolation: 0   Intimate Partner Violence: Not on file   Housing Stability: Not on File (2024)    Received from [facility]    Housing Stability     Housin       Surgical History  Past Surgical History:   Procedure Laterality Date    FOOT SURGERY  10/15/2015    Foot Surgery    OTHER SURGICAL HISTORY  2022    Hysteroscopic uterine polypectomy    OTHER SURGICAL HISTORY  2022    Loop electrosurgical excision procedure    OTHER SURGICAL HISTORY  2022    Knee surgery       Physical  Exam:  GENERAL:  Patient is awake, alert, and oriented to person place and time.  Patient appears well nourished and well kept.  Affect Calm, Not Acutely Distressed.  HEENT:  Normocephalic, Atraumatic, EOMI  CARDIOVASCULAR:  Hemodynamically stable.  RESPIRATORY:  Normal respirations with unlabored breathing.  Extremity:  Left knee examination shows skin is intact.  There is no erythema or warmth.  Trace amount of effusion.  Can flex the right knee to 120 degrees with no pain.  Full extension at 0 degrees.  Mild pain over the medial joint line.  Mild pain over the lateral joint line.  There is no pain over the patellar or quadricep tendon.  Mild pain over the proximal tibia.  No pain over the popliteal fossa.  Negative valgus stress test.  Negative varus stress test.  Negative Gabriela's test medially with no instability.  Negative Gabriela's test laterally with no instability.  Negative Lachman's test.  Patellar and quadricep mechanism intact.  Negative anterior and posterior drawer test.  Negative patellar apprehension test.  Distal pulses are palpable, neurovascularly intact.  Walking with no significant antalgic gait.     Right foot and ankle shows skin is intact.  No erythema or warmth.  There is no clinical signs of infection.  There is no pain of the lateral or medial malleolus.  There is no pain of the base of the fifth metatarsal bone.  Pain mainly with the midfoot.  Pes planus of the right foot and left foot.  No pain in the calcaneus.  Negative Rubio's test.  Distal pulses are palpable.  Left foot examined for comparison.      Diagnostics: X-rays reviewed  XR knee left 1-2 views  Interpreted By:  Shannon Magallanes,   STUDY:  XR KNEE LEFT 1-2 VIEWS, 1/30/2025 3:47 pm      INDICATION:  Signs/Symptoms:pain      ACCESSION NUMBER(S):  [id number]      ORDERING CLINICIAN:  SHANNON MAGALLANES      FINDINGS:  Left knee x-rays two views AP and lateral view: Stable appearing and  satisfactory healing nondisplaced  central and lateral tibial plateau  fracture, showing signs of interval healing with increased callus  formation. Similar appearing advanced degenerative changes, most  pronounced at the medial compartment with significant joint space  narrowing.          Signed by: Shannon Giles 1/30/2025 6:26 PM  Dictation workstation:   [id number]        Procedure: None    Assessment:   1.  Acute left knee lateral tibial plateau fracture  2.  Severe left knee osteoarthritis with superimposed medial meniscal tear  3.  Right midfoot arthritis  4.  Bilateral pes planus    Plan: Amber presents today for reevaluation for left knee lateral tibial plateau fracture and severe left knee osteoarthritis with superimposed medial meniscal tear. She is clinically doing well in regards to her left knee with healing tibial plateau fracture and aggravation of left knee osteoarthritis.  We discussed the possibility of future viscosupplement injections.  We discussed if she fails all conservative treatment options, she may be candidate for knee replacement options.  She having more pain in the right foot secondary to aggravation of her right midfoot arthritis, with possible loosening of hardware.  Most likely aggravated due to overcompensation of the right due to her left knee injury.  Will place on a Medrol Dosepak, custom orthotics for both her feet.  She will follow-up in 4 weeks, if no improvement we may consider further advanced imaging, possible referral to foot and ankle for painful hardware.      Orders Placed This Encounter    XR knee left 1-2 views    XR foot right 3+ views      At the conclusion of the visit there were no further questions by the patient/family regarding their plan of care.  Patient was instructed to call or return with any issues, questions, or concerns regarding their injury and/or treatment plan described above.     03/13/25 at 9:36 AM - Shannon Giles MD  Scribe Attestation  By signing my name below, Juan ROCKWELL  Zka Dimas   attest that this documentation has been prepared under the direction and in the presence of Shannon Giles MD.    Office: [phone]    This note was prepared using voice recognition software.  The details of this note are correct and have been reviewed, and corrected to the best of my ability.  Some grammatical errors may persist related to the Dragon software.

## 2025-04-10 ENCOUNTER — OFFICE VISIT (OUTPATIENT)
Dept: ORTHOPEDIC SURGERY | Facility: CLINIC | Age: 65
End: 2025-04-10
Payer: MEDICARE

## 2025-04-10 DIAGNOSIS — S82.143A POSTERIOR TIBIAL PLATEAU FRACTURE, UNSPECIFIED LATERALITY, CLOSED, INITIAL ENCOUNTER: ICD-10-CM

## 2025-04-10 DIAGNOSIS — M17.12 PRIMARY OSTEOARTHRITIS OF LEFT KNEE: Primary | ICD-10-CM

## 2025-04-10 PROCEDURE — 99213 OFFICE O/P EST LOW 20 MIN: CPT | Performed by: INTERNAL MEDICINE

## 2025-04-10 NOTE — PROGRESS NOTES
CC:   No chief complaint on file.      HPI: Amber is a 65 y.o. female presents today for reevaluation for left knee lateral tibial plateau fracture and severe left knee osteoarthritis with superimposed medial meniscal tear. She states that she is doing much better regarding the knee. She notes intermittent pain and tingling in her right foot.  Did get improvement with the oral steroids.  Overall she is pleased with the progress she has made so far.        Review of Systems   GENERAL: Negative for malaise, significant weight loss, fever  MUSCULOSKELETAL: See HPI  NEURO:  Negative for numbness / tingling     Past Medical History  Past Medical History:   Diagnosis Date    Other seasonal allergic rhinitis     Seasonal allergies    Personal history of other diseases of the musculoskeletal system and connective tissue     History of arthritis    Personal history of other diseases of the respiratory system     History of asthma       Medication review  Medication Documentation Review Audit       Reviewed by Eve Koo MA (Medical Assistant) on 25 at 0911      Medication Order Taking? Sig Documenting Provider Last Dose Status   albuterol 90 mcg/actuation inhaler [id number]  INHALE 2 PUFFS BY MOUTH EVERY 4 HOURS AS NEEDED Charlene Collado MD   24 2359   insulin lispro (HumaLOG) 100 unit/mL injection [id number]  USE WITH SLIDING SCALE Ramin Carter MD   24 235   ipratropium-albuteroL (Duo-Neb) 0.5-2.5 mg/3 mL nebulizer solution [id number]  USE 1 VIAL IN NEBULIZER FOUR TIMES A DAY AS NEEDED Russell Rodríguez MD   24 235   lisinopril 20 mg tablet [id number]  TAKE 1 TABLET BY MOUTH ONCE DAILY Keaton Jarrett MD   24 235   metFORMIN XR (Glucophage-XR) 500 mg 24 hr tablet [id number]  TAKE 2 TABLETS BY MOUTH TWO TIMES A DAY Keaton Jarrett MD   24 235   metFORMIN XR (Glucophage-XR) 500 mg 24 hr tablet [id number]  TAKE 1 TABLET BY MOUTH TWO TIMES A  DAY Ramin Carter MD   24 2359                    Allergies  No Known Allergies    Social History  Social History     Socioeconomic History    Marital status: Single     Spouse name: Not on file    Number of children: Not on file    Years of education: Not on file    Highest education level: Not on file   Occupational History    Not on file   Tobacco Use    Smoking status: Never    Smokeless tobacco: Never   Vaping Use    Vaping status: Never Used   Substance and Sexual Activity    Alcohol use: Never    Drug use: Never    Sexual activity: Not on file   Other Topics Concern    Not on file   Social History Narrative    Not on file     Social Drivers of Health     Financial Resource Strain: Not on File (2024)    Received from [facility]    Financial Resource Strain     Financial Resource Strain: 0   Food Insecurity: Not on File (2024)    Received from [facility]    Food Insecurity     Food: 0   Transportation Needs: Not on File (2024)    Received from [facility]    Transportation Needs     Transportation: 0   Physical Activity: Not on File (2024)    Received from [facility]    Physical Activity     Physical Activity: 0   Stress: Not on File (2024)    Received from [facility]    Stress     Stress: 0   Social Connections: Not on File (2024)    Received from [facility]    Social Connections     Social Connections and Isolation: 0   Intimate Partner Violence: Not on file   Housing Stability: Not on File (2024)    Received from [facility]    Housing Stability     Housin       Surgical History  Past Surgical History:   Procedure Laterality Date    FOOT SURGERY  10/15/2015    Foot Surgery    OTHER SURGICAL HISTORY  2022    Hysteroscopic uterine polypectomy    OTHER SURGICAL HISTORY  2022    Loop electrosurgical excision procedure    OTHER SURGICAL HISTORY  2022    Knee surgery       Physical Exam:  GENERAL:  Patient is awake, alert, and oriented to person place and time.  Patient appears  well nourished and well kept.  Affect Calm, Not Acutely Distressed.  HEENT:  Normocephalic, Atraumatic, EOMI  CARDIOVASCULAR:  Hemodynamically stable.  RESPIRATORY:  Normal respirations with unlabored breathing.  Extremity: Left knee examination shows skin is intact.  There is no erythema or warmth.  Trace amount of effusion.  Can flex the right knee to 125 degrees with no pain.  Full extension at 0 degrees.  Mild pain over the medial joint line.  No pain over the lateral joint line.  There is no pain over the patellar or quadricep tendon.  No pain over the proximal tibia.  No pain over the popliteal fossa.  Negative valgus stress test.  Negative varus stress test.  Negative Gabriela's test medially with no instability.  Negative Gabriela's test laterally with no instability.  Negative Lachman's test.  Patellar and quadricep mechanism intact.  Negative anterior and posterior drawer test.  Negative patellar apprehension test.  Distal pulses are palpable, neurovascularly intact.  Walking with no significant antalgic gait.      Right foot and ankle shows skin is intact.  No erythema or warmth.  There is no clinical signs of infection.  There is no pain of the lateral or medial malleolus.  There is no pain of the base of the fifth metatarsal bone.  Pain mainly with the midfoot.  Pes planus of the right foot and left foot.  No pain in the calcaneus.  Negative Rubio's test.  Distal pulses are palpable.  Left foot examined for comparison.      Diagnostics: None today  XR knee left 1-2 views  Interpreted By:  Shannon Magallanes,   STUDY:  XR KNEE LEFT 1-2 VIEWS, 3/13/2025 9:30 am      INDICATION:  Signs/Symptoms:pain      ACCESSION NUMBER(S):  [id number]      ORDERING CLINICIAN:  SHANNON MAGALLANES      FINDINGS:  Left knee x-rays two views AP and lateral view: Stable appearing and  satisfactory healing subacute nondisplaced central and lateral tibial  plateau fracture, showing signs of interval healing with increased  callus  formation. Similar appearing advanced degenerative changes of  the left knee, most pronounced at the medial compartment with  significant joint space narrowing.          Signed by: Shannon Magallanes 3/13/2025 7:50 PM  Dictation workstation:   [id number]  XR foot right 3+ views  Interpreted By:  Shannon Magallanes,   STUDY:  XR FOOT RIGHT 3+ VIEWS, 3/13/2025 9:30 am      INDICATION:  Signs/Symptoms:pain      ACCESSION NUMBER(S):  [id number]      ORDERING CLINICIAN:  SHANNON MAGALLANES      FINDINGS:  Right foot x-rays three views AP, lateral and oblique view: No acute  fractures, no dislocation. Stable appearing orthopedic hardware.  Degenerative changes of the midfoot.          Signed by: Shannon Magallanes 3/13/2025 7:49 PM  Dictation workstation:   [id number]        Procedure: None    Assessment:   1.  Subacute left knee lateral tibial plateau fracture  2.  Severe left knee osteoarthritis with superimposed medial meniscal tear  3.  Right midfoot arthritis  4.  Bilateral pes planus    Plan: Amber presents today for reevaluation for left knee lateral tibial plateau fracture and severe left knee osteoarthritis with superimposed medial meniscal tear. She is clinically doing well.  Overall she is very pleased with the progress she has made so far.  We discussed the possibility of future viscosupplementation to the left knee.  We discussed if she fails all consider treatment options, she may be candidate for left knee total replacement.  She is going to make her appointment to obtain her custom orthotics, we discussed if has persistent pain in her foot may consider referral to foot and ankle or podiatry for possible painful hardware removal.  She will follow-up as needed.      No orders of the defined types were placed in this encounter.     At the conclusion of the visit there were no further questions by the patient/family regarding their plan of care.  Patient was instructed to call or return with any issues, questions, or  concerns regarding their injury and/or treatment plan described above.     04/10/25 at 9:30 AM - Shannon Giles MD  Scribe Attestation  By signing my name below, I, Juan Dimas, Scribe   attest that this documentation has been prepared under the direction and in the presence of Shannon Giles MD.    Office: [phone]    This note was prepared using voice recognition software.  The details of this note are correct and have been reviewed, and corrected to the best of my ability.  Some grammatical errors may persist related to the Dragon software.